# Patient Record
Sex: FEMALE | Race: WHITE | Employment: OTHER | URBAN - METROPOLITAN AREA
[De-identification: names, ages, dates, MRNs, and addresses within clinical notes are randomized per-mention and may not be internally consistent; named-entity substitution may affect disease eponyms.]

---

## 2018-01-01 ENCOUNTER — APPOINTMENT (OUTPATIENT)
Dept: CT IMAGING | Age: 78
DRG: 100 | End: 2018-01-01
Attending: PSYCHIATRY & NEUROLOGY
Payer: MEDICARE

## 2018-01-01 ENCOUNTER — APPOINTMENT (OUTPATIENT)
Dept: MRI IMAGING | Age: 78
DRG: 100 | End: 2018-01-01
Attending: PSYCHIATRY & NEUROLOGY
Payer: MEDICARE

## 2018-01-01 ENCOUNTER — OFFICE VISIT (OUTPATIENT)
Dept: NEUROLOGY | Age: 78
End: 2018-01-01
Payer: MEDICARE

## 2018-01-01 ENCOUNTER — APPOINTMENT (OUTPATIENT)
Dept: GENERAL RADIOLOGY | Age: 78
DRG: 100 | End: 2018-01-01
Attending: PSYCHIATRY & NEUROLOGY
Payer: MEDICARE

## 2018-01-01 ENCOUNTER — TELEPHONE (OUTPATIENT)
Dept: NEUROLOGY | Age: 78
End: 2018-01-01

## 2018-01-01 ENCOUNTER — HOSPITAL ENCOUNTER (INPATIENT)
Age: 78
LOS: 6 days | Discharge: NURSING, REHAB OR LONG-TERM CARE | DRG: 100 | End: 2018-09-10
Attending: PSYCHIATRY & NEUROLOGY | Admitting: PSYCHIATRY & NEUROLOGY
Payer: MEDICARE

## 2018-01-01 VITALS
HEART RATE: 67 BPM | HEIGHT: 60 IN | BODY MASS INDEX: 31.02 KG/M2 | SYSTOLIC BLOOD PRESSURE: 111 MMHG | WEIGHT: 158 LBS | DIASTOLIC BLOOD PRESSURE: 65 MMHG

## 2018-01-01 VITALS
WEIGHT: 164 LBS | SYSTOLIC BLOOD PRESSURE: 96 MMHG | BODY MASS INDEX: 32.2 KG/M2 | HEART RATE: 65 BPM | HEIGHT: 60 IN | DIASTOLIC BLOOD PRESSURE: 63 MMHG

## 2018-01-01 VITALS
HEART RATE: 68 BPM | RESPIRATION RATE: 15 BRPM | WEIGHT: 155.87 LBS | BODY MASS INDEX: 29.43 KG/M2 | DIASTOLIC BLOOD PRESSURE: 68 MMHG | TEMPERATURE: 97.6 F | SYSTOLIC BLOOD PRESSURE: 122 MMHG | HEIGHT: 61 IN | OXYGEN SATURATION: 98 %

## 2018-01-01 DIAGNOSIS — R41.0 EPISODE OF CONFUSION: Primary | ICD-10-CM

## 2018-01-01 DIAGNOSIS — I10 ESSENTIAL HYPERTENSION: ICD-10-CM

## 2018-01-01 DIAGNOSIS — I63.30 CEREBROVASCULAR ACCIDENT (CVA) DUE TO THROMBOSIS OF CEREBRAL ARTERY (HCC): Primary | ICD-10-CM

## 2018-01-01 DIAGNOSIS — R56.9 SEIZURE (HCC): ICD-10-CM

## 2018-01-01 DIAGNOSIS — F01.50 VASCULAR DEMENTIA WITHOUT BEHAVIORAL DISTURBANCE (HCC): ICD-10-CM

## 2018-01-01 DIAGNOSIS — Z86.73 HISTORY OF STROKE: ICD-10-CM

## 2018-01-01 DIAGNOSIS — I60.9 SAH (SUBARACHNOID HEMORRHAGE) (HCC): ICD-10-CM

## 2018-01-01 LAB
-: ABNORMAL
ABSOLUTE EOS #: 0.35 K/UL (ref 0–0.44)
ABSOLUTE IMMATURE GRANULOCYTE: 0.06 K/UL (ref 0–0.3)
ABSOLUTE LYMPH #: 1.6 K/UL (ref 1.1–3.7)
ABSOLUTE MONO #: 0.65 K/UL (ref 0.1–1.2)
ALBUMIN SERPL-MCNC: 3.2 G/DL (ref 3.5–5.2)
ALBUMIN SERPL-MCNC: 3.3 G/DL (ref 3.5–5.2)
ALBUMIN SERPL-MCNC: 3.3 G/DL (ref 3.5–5.2)
ALBUMIN SERPL-MCNC: 3.4 G/DL (ref 3.5–5.2)
ALBUMIN SERPL-MCNC: 3.4 G/DL (ref 3.5–5.2)
ALBUMIN SERPL-MCNC: 3.5 G/DL (ref 3.5–5.2)
ALBUMIN SERPL-MCNC: 3.5 G/DL (ref 3.5–5.2)
ALBUMIN/GLOBULIN RATIO: 1.1 (ref 1–2.5)
ALBUMIN/GLOBULIN RATIO: 1.1 (ref 1–2.5)
ALBUMIN/GLOBULIN RATIO: 1.2 (ref 1–2.5)
ALBUMIN/GLOBULIN RATIO: 1.3 (ref 1–2.5)
ALP BLD-CCNC: 81 U/L (ref 35–104)
ALP BLD-CCNC: 86 U/L (ref 35–104)
ALP BLD-CCNC: 86 U/L (ref 35–104)
ALP BLD-CCNC: 87 U/L (ref 35–104)
ALP BLD-CCNC: 91 U/L (ref 35–104)
ALP BLD-CCNC: 94 U/L (ref 35–104)
ALP BLD-CCNC: 96 U/L (ref 35–104)
ALT SERPL-CCNC: 11 U/L (ref 5–33)
ALT SERPL-CCNC: 13 U/L (ref 5–33)
ALT SERPL-CCNC: 14 U/L (ref 5–33)
ALT SERPL-CCNC: 14 U/L (ref 5–33)
ALT SERPL-CCNC: 15 U/L (ref 5–33)
ALT SERPL-CCNC: 16 U/L (ref 5–33)
ALT SERPL-CCNC: 17 U/L (ref 5–33)
AMORPHOUS: ABNORMAL
ANION GAP SERPL CALCULATED.3IONS-SCNC: 11 MMOL/L (ref 9–17)
ANION GAP SERPL CALCULATED.3IONS-SCNC: 11 MMOL/L (ref 9–17)
ANION GAP SERPL CALCULATED.3IONS-SCNC: 12 MMOL/L (ref 9–17)
ANION GAP SERPL CALCULATED.3IONS-SCNC: 12 MMOL/L (ref 9–17)
ANION GAP SERPL CALCULATED.3IONS-SCNC: 13 MMOL/L (ref 9–17)
ANION GAP SERPL CALCULATED.3IONS-SCNC: 13 MMOL/L (ref 9–17)
ANION GAP SERPL CALCULATED.3IONS-SCNC: 15 MMOL/L (ref 9–17)
AST SERPL-CCNC: 13 U/L
AST SERPL-CCNC: 18 U/L
AST SERPL-CCNC: 19 U/L
AST SERPL-CCNC: 19 U/L
AST SERPL-CCNC: 21 U/L
AST SERPL-CCNC: 22 U/L
AST SERPL-CCNC: 22 U/L
BACTERIA: ABNORMAL
BASOPHILS # BLD: 1 % (ref 0–2)
BASOPHILS ABSOLUTE: 0.08 K/UL (ref 0–0.2)
BILIRUB SERPL-MCNC: 0.17 MG/DL (ref 0.3–1.2)
BILIRUB SERPL-MCNC: 0.2 MG/DL (ref 0.3–1.2)
BILIRUB SERPL-MCNC: 0.21 MG/DL (ref 0.3–1.2)
BILIRUB SERPL-MCNC: 0.25 MG/DL (ref 0.3–1.2)
BILIRUB SERPL-MCNC: 0.26 MG/DL (ref 0.3–1.2)
BILIRUB SERPL-MCNC: <0.1 MG/DL (ref 0.3–1.2)
BILIRUB SERPL-MCNC: <0.1 MG/DL (ref 0.3–1.2)
BILIRUBIN URINE: NEGATIVE
BUN BLDV-MCNC: 11 MG/DL (ref 8–23)
BUN BLDV-MCNC: 12 MG/DL (ref 8–23)
BUN BLDV-MCNC: 8 MG/DL (ref 8–23)
BUN/CREAT BLD: ABNORMAL (ref 9–20)
CALCIUM SERPL-MCNC: 8.8 MG/DL (ref 8.6–10.4)
CALCIUM SERPL-MCNC: 8.9 MG/DL (ref 8.6–10.4)
CALCIUM SERPL-MCNC: 8.9 MG/DL (ref 8.6–10.4)
CALCIUM SERPL-MCNC: 9 MG/DL (ref 8.6–10.4)
CALCIUM SERPL-MCNC: 9.1 MG/DL (ref 8.6–10.4)
CALCIUM SERPL-MCNC: 9.1 MG/DL (ref 8.6–10.4)
CALCIUM SERPL-MCNC: 9.2 MG/DL (ref 8.6–10.4)
CASTS UA: ABNORMAL /LPF (ref 0–2)
CHLORIDE BLD-SCNC: 101 MMOL/L (ref 98–107)
CHLORIDE BLD-SCNC: 102 MMOL/L (ref 98–107)
CHLORIDE BLD-SCNC: 103 MMOL/L (ref 98–107)
CHOLESTEROL/HDL RATIO: 2.9
CHOLESTEROL: 123 MG/DL
CO2: 21 MMOL/L (ref 20–31)
CO2: 22 MMOL/L (ref 20–31)
CO2: 24 MMOL/L (ref 20–31)
CO2: 25 MMOL/L (ref 20–31)
CO2: 25 MMOL/L (ref 20–31)
COLOR: YELLOW
COMMENT UA: ABNORMAL
CREAT SERPL-MCNC: 0.56 MG/DL (ref 0.5–0.9)
CREAT SERPL-MCNC: 0.57 MG/DL (ref 0.5–0.9)
CREAT SERPL-MCNC: 0.59 MG/DL (ref 0.5–0.9)
CREAT SERPL-MCNC: 0.59 MG/DL (ref 0.5–0.9)
CREAT SERPL-MCNC: 0.6 MG/DL (ref 0.5–0.9)
CREAT SERPL-MCNC: 0.6 MG/DL (ref 0.5–0.9)
CREAT SERPL-MCNC: 0.69 MG/DL (ref 0.5–0.9)
CRYSTALS, UA: ABNORMAL /HPF
CULTURE: NORMAL
DIFFERENTIAL TYPE: ABNORMAL
EOSINOPHILS RELATIVE PERCENT: 5 % (ref 1–4)
EPITHELIAL CELLS UA: ABNORMAL /HPF (ref 0–5)
ESTIMATED AVERAGE GLUCOSE: 154 MG/DL
FOLATE: 5.7 NG/ML
GFR AFRICAN AMERICAN: >60 ML/MIN
GFR NON-AFRICAN AMERICAN: >60 ML/MIN
GFR SERPL CREATININE-BSD FRML MDRD: ABNORMAL ML/MIN/{1.73_M2}
GLUCOSE BLD-MCNC: 132 MG/DL (ref 70–99)
GLUCOSE BLD-MCNC: 136 MG/DL (ref 70–99)
GLUCOSE BLD-MCNC: 139 MG/DL (ref 70–99)
GLUCOSE BLD-MCNC: 140 MG/DL (ref 70–99)
GLUCOSE BLD-MCNC: 143 MG/DL (ref 70–99)
GLUCOSE BLD-MCNC: 145 MG/DL (ref 70–99)
GLUCOSE BLD-MCNC: 147 MG/DL (ref 70–99)
GLUCOSE URINE: ABNORMAL
HBA1C MFR BLD: 7 % (ref 4–6)
HCT VFR BLD CALC: 38.8 % (ref 36.3–47.1)
HCT VFR BLD CALC: 40.6 % (ref 36.3–47.1)
HDLC SERPL-MCNC: 42 MG/DL
HEMOGLOBIN: 12.4 G/DL (ref 11.9–15.1)
HEMOGLOBIN: 12.8 G/DL (ref 11.9–15.1)
IMMATURE GRANULOCYTES: 1 %
KETONES, URINE: ABNORMAL
LDL CHOLESTEROL: 17 MG/DL (ref 0–130)
LEUKOCYTE ESTERASE, URINE: ABNORMAL
LV EF: 55 %
LVEF MODALITY: NORMAL
LYMPHOCYTES # BLD: 21 % (ref 24–43)
Lab: NORMAL
MAGNESIUM: 2 MG/DL (ref 1.6–2.6)
MAGNESIUM: 2 MG/DL (ref 1.6–2.6)
MAGNESIUM: 2.1 MG/DL (ref 1.6–2.6)
MAGNESIUM: 2.1 MG/DL (ref 1.6–2.6)
MAGNESIUM: 2.2 MG/DL (ref 1.6–2.6)
MAGNESIUM: 2.2 MG/DL (ref 1.6–2.6)
MAGNESIUM: 2.3 MG/DL (ref 1.6–2.6)
MCH RBC QN AUTO: 28.1 PG (ref 25.2–33.5)
MCH RBC QN AUTO: 28.5 PG (ref 25.2–33.5)
MCHC RBC AUTO-ENTMCNC: 31.5 G/DL (ref 28.4–34.8)
MCHC RBC AUTO-ENTMCNC: 32 G/DL (ref 28.4–34.8)
MCV RBC AUTO: 89.2 FL (ref 82.6–102.9)
MCV RBC AUTO: 89.2 FL (ref 82.6–102.9)
MONOCYTES # BLD: 9 % (ref 3–12)
MUCUS: ABNORMAL
NITRITE, URINE: NEGATIVE
NRBC AUTOMATED: 0 PER 100 WBC
NRBC AUTOMATED: 0 PER 100 WBC
OTHER OBSERVATIONS UA: ABNORMAL
PDW BLD-RTO: 14.9 % (ref 11.8–14.4)
PDW BLD-RTO: 15.2 % (ref 11.8–14.4)
PH UA: 6 (ref 5–8)
PHOSPHORUS: 3.2 MG/DL (ref 2.6–4.5)
PLATELET # BLD: 175 K/UL (ref 138–453)
PLATELET # BLD: 179 K/UL (ref 138–453)
PLATELET ESTIMATE: ABNORMAL
PMV BLD AUTO: 11 FL (ref 8.1–13.5)
PMV BLD AUTO: 11.2 FL (ref 8.1–13.5)
POTASSIUM SERPL-SCNC: 3.6 MMOL/L (ref 3.7–5.3)
POTASSIUM SERPL-SCNC: 3.7 MMOL/L (ref 3.7–5.3)
POTASSIUM SERPL-SCNC: 3.7 MMOL/L (ref 3.7–5.3)
POTASSIUM SERPL-SCNC: 4 MMOL/L (ref 3.7–5.3)
PROTEIN UA: NEGATIVE
RBC # BLD: 4.35 M/UL (ref 3.95–5.11)
RBC # BLD: 4.55 M/UL (ref 3.95–5.11)
RBC # BLD: ABNORMAL 10*6/UL
RBC UA: ABNORMAL /HPF (ref 0–2)
RENAL EPITHELIAL, UA: ABNORMAL /HPF
SEG NEUTROPHILS: 63 % (ref 36–65)
SEGMENTED NEUTROPHILS ABSOLUTE COUNT: 4.93 K/UL (ref 1.5–8.1)
SODIUM BLD-SCNC: 136 MMOL/L (ref 135–144)
SODIUM BLD-SCNC: 136 MMOL/L (ref 135–144)
SODIUM BLD-SCNC: 137 MMOL/L (ref 135–144)
SODIUM BLD-SCNC: 137 MMOL/L (ref 135–144)
SODIUM BLD-SCNC: 138 MMOL/L (ref 135–144)
SODIUM BLD-SCNC: 140 MMOL/L (ref 135–144)
SODIUM BLD-SCNC: 140 MMOL/L (ref 135–144)
SPECIFIC GRAVITY UA: 1.01 (ref 1–1.03)
SPECIMEN DESCRIPTION: NORMAL
STATUS: NORMAL
THYROXINE, FREE: 0.78 NG/DL (ref 0.93–1.7)
TOTAL PROTEIN: 6 G/DL (ref 6.4–8.3)
TOTAL PROTEIN: 6 G/DL (ref 6.4–8.3)
TOTAL PROTEIN: 6.1 G/DL (ref 6.4–8.3)
TOTAL PROTEIN: 6.2 G/DL (ref 6.4–8.3)
TOTAL PROTEIN: 6.2 G/DL (ref 6.4–8.3)
TOTAL PROTEIN: 6.3 G/DL (ref 6.4–8.3)
TOTAL PROTEIN: 6.5 G/DL (ref 6.4–8.3)
TRICHOMONAS: ABNORMAL
TRIGL SERPL-MCNC: 322 MG/DL
TSH SERPL DL<=0.05 MIU/L-ACNC: 7.51 MIU/L (ref 0.3–5)
TURBIDITY: CLEAR
URINE HGB: ABNORMAL
UROBILINOGEN, URINE: NORMAL
VITAMIN B-12: 352 PG/ML (ref 232–1245)
VLDLC SERPL CALC-MCNC: ABNORMAL MG/DL (ref 1–30)
WBC # BLD: 7.7 K/UL (ref 3.5–11.3)
WBC # BLD: 8.4 K/UL (ref 3.5–11.3)
WBC # BLD: ABNORMAL 10*3/UL
WBC UA: ABNORMAL /HPF (ref 0–5)
YEAST: ABNORMAL

## 2018-01-01 PROCEDURE — 99214 OFFICE O/P EST MOD 30 MIN: CPT | Performed by: PSYCHIATRY & NEUROLOGY

## 2018-01-01 PROCEDURE — G8484 FLU IMMUNIZE NO ADMIN: HCPCS | Performed by: PSYCHIATRY & NEUROLOGY

## 2018-01-01 PROCEDURE — 99232 SBSQ HOSP IP/OBS MODERATE 35: CPT | Performed by: PSYCHIATRY & NEUROLOGY

## 2018-01-01 PROCEDURE — 2060000000 HC ICU INTERMEDIATE R&B

## 2018-01-01 PROCEDURE — G8987 SELF CARE CURRENT STATUS: HCPCS

## 2018-01-01 PROCEDURE — 36415 COLL VENOUS BLD VENIPUNCTURE: CPT

## 2018-01-01 PROCEDURE — G8988 SELF CARE GOAL STATUS: HCPCS

## 2018-01-01 PROCEDURE — 84439 ASSAY OF FREE THYROXINE: CPT

## 2018-01-01 PROCEDURE — 6370000000 HC RX 637 (ALT 250 FOR IP): Performed by: INTERNAL MEDICINE

## 2018-01-01 PROCEDURE — 93306 TTE W/DOPPLER COMPLETE: CPT

## 2018-01-01 PROCEDURE — 6370000000 HC RX 637 (ALT 250 FOR IP): Performed by: STUDENT IN AN ORGANIZED HEALTH CARE EDUCATION/TRAINING PROGRAM

## 2018-01-01 PROCEDURE — 87086 URINE CULTURE/COLONY COUNT: CPT

## 2018-01-01 PROCEDURE — G8417 CALC BMI ABV UP PARAM F/U: HCPCS | Performed by: PSYCHIATRY & NEUROLOGY

## 2018-01-01 PROCEDURE — 83735 ASSAY OF MAGNESIUM: CPT

## 2018-01-01 PROCEDURE — 6370000000 HC RX 637 (ALT 250 FOR IP): Performed by: NURSE PRACTITIONER

## 2018-01-01 PROCEDURE — 95816 EEG AWAKE AND DROWSY: CPT | Performed by: PSYCHIATRY & NEUROLOGY

## 2018-01-01 PROCEDURE — 94762 N-INVAS EAR/PLS OXIMTRY CONT: CPT

## 2018-01-01 PROCEDURE — 70496 CT ANGIOGRAPHY HEAD: CPT

## 2018-01-01 PROCEDURE — 2580000003 HC RX 258: Performed by: STUDENT IN AN ORGANIZED HEALTH CARE EDUCATION/TRAINING PROGRAM

## 2018-01-01 PROCEDURE — 6360000002 HC RX W HCPCS: Performed by: STUDENT IN AN ORGANIZED HEALTH CARE EDUCATION/TRAINING PROGRAM

## 2018-01-01 PROCEDURE — 80053 COMPREHEN METABOLIC PANEL: CPT

## 2018-01-01 PROCEDURE — G8598 ASA/ANTIPLAT THER USED: HCPCS | Performed by: PSYCHIATRY & NEUROLOGY

## 2018-01-01 PROCEDURE — 97535 SELF CARE MNGMENT TRAINING: CPT

## 2018-01-01 PROCEDURE — 99233 SBSQ HOSP IP/OBS HIGH 50: CPT | Performed by: PSYCHIATRY & NEUROLOGY

## 2018-01-01 PROCEDURE — 82607 VITAMIN B-12: CPT

## 2018-01-01 PROCEDURE — 70551 MRI BRAIN STEM W/O DYE: CPT

## 2018-01-01 PROCEDURE — 85025 COMPLETE CBC W/AUTO DIFF WBC: CPT

## 2018-01-01 PROCEDURE — 1090F PRES/ABSN URINE INCON ASSESS: CPT | Performed by: PSYCHIATRY & NEUROLOGY

## 2018-01-01 PROCEDURE — 4040F PNEUMOC VAC/ADMIN/RCVD: CPT | Performed by: PSYCHIATRY & NEUROLOGY

## 2018-01-01 PROCEDURE — 82746 ASSAY OF FOLIC ACID SERUM: CPT

## 2018-01-01 PROCEDURE — 97530 THERAPEUTIC ACTIVITIES: CPT

## 2018-01-01 PROCEDURE — 71045 X-RAY EXAM CHEST 1 VIEW: CPT

## 2018-01-01 PROCEDURE — 1123F ACP DISCUSS/DSCN MKR DOCD: CPT | Performed by: PSYCHIATRY & NEUROLOGY

## 2018-01-01 PROCEDURE — 6360000004 HC RX CONTRAST MEDICATION: Performed by: PSYCHIATRY & NEUROLOGY

## 2018-01-01 PROCEDURE — 1111F DSCHRG MED/CURRENT MED MERGE: CPT | Performed by: PSYCHIATRY & NEUROLOGY

## 2018-01-01 PROCEDURE — 84100 ASSAY OF PHOSPHORUS: CPT

## 2018-01-01 PROCEDURE — 97162 PT EVAL MOD COMPLEX 30 MIN: CPT

## 2018-01-01 PROCEDURE — 83036 HEMOGLOBIN GLYCOSYLATED A1C: CPT

## 2018-01-01 PROCEDURE — 84443 ASSAY THYROID STIM HORMONE: CPT

## 2018-01-01 PROCEDURE — G8427 DOCREV CUR MEDS BY ELIG CLIN: HCPCS | Performed by: PSYCHIATRY & NEUROLOGY

## 2018-01-01 PROCEDURE — 81001 URINALYSIS AUTO W/SCOPE: CPT

## 2018-01-01 PROCEDURE — 99232 SBSQ HOSP IP/OBS MODERATE 35: CPT | Performed by: INTERNAL MEDICINE

## 2018-01-01 PROCEDURE — G8400 PT W/DXA NO RESULTS DOC: HCPCS | Performed by: PSYCHIATRY & NEUROLOGY

## 2018-01-01 PROCEDURE — G8979 MOBILITY GOAL STATUS: HCPCS

## 2018-01-01 PROCEDURE — 99221 1ST HOSP IP/OBS SF/LOW 40: CPT | Performed by: INTERNAL MEDICINE

## 2018-01-01 PROCEDURE — 97166 OT EVAL MOD COMPLEX 45 MIN: CPT

## 2018-01-01 PROCEDURE — 70498 CT ANGIOGRAPHY NECK: CPT

## 2018-01-01 PROCEDURE — 99238 HOSP IP/OBS DSCHRG MGMT 30/<: CPT | Performed by: PSYCHIATRY & NEUROLOGY

## 2018-01-01 PROCEDURE — 1036F TOBACCO NON-USER: CPT | Performed by: PSYCHIATRY & NEUROLOGY

## 2018-01-01 PROCEDURE — 80061 LIPID PANEL: CPT

## 2018-01-01 PROCEDURE — 99223 1ST HOSP IP/OBS HIGH 75: CPT | Performed by: PSYCHIATRY & NEUROLOGY

## 2018-01-01 PROCEDURE — 85027 COMPLETE CBC AUTOMATED: CPT

## 2018-01-01 PROCEDURE — 1101F PT FALLS ASSESS-DOCD LE1/YR: CPT | Performed by: PSYCHIATRY & NEUROLOGY

## 2018-01-01 PROCEDURE — 97110 THERAPEUTIC EXERCISES: CPT

## 2018-01-01 PROCEDURE — 97116 GAIT TRAINING THERAPY: CPT

## 2018-01-01 PROCEDURE — 95816 EEG AWAKE AND DROWSY: CPT

## 2018-01-01 PROCEDURE — 99232 SBSQ HOSP IP/OBS MODERATE 35: CPT | Performed by: NURSE PRACTITIONER

## 2018-01-01 PROCEDURE — 99215 OFFICE O/P EST HI 40 MIN: CPT | Performed by: PSYCHIATRY & NEUROLOGY

## 2018-01-01 PROCEDURE — G8978 MOBILITY CURRENT STATUS: HCPCS

## 2018-01-01 RX ORDER — ACETAMINOPHEN 325 MG/1
650 TABLET ORAL EVERY 4 HOURS PRN
Status: DISCONTINUED | OUTPATIENT
Start: 2018-01-01 | End: 2018-01-01 | Stop reason: HOSPADM

## 2018-01-01 RX ORDER — SODIUM CHLORIDE 9 MG/ML
INJECTION, SOLUTION INTRAVENOUS CONTINUOUS
Status: DISCONTINUED | OUTPATIENT
Start: 2018-01-01 | End: 2018-01-01

## 2018-01-01 RX ORDER — SODIUM CHLORIDE 0.9 % (FLUSH) 0.9 %
10 SYRINGE (ML) INJECTION PRN
Status: DISCONTINUED | OUTPATIENT
Start: 2018-01-01 | End: 2018-01-01 | Stop reason: HOSPADM

## 2018-01-01 RX ORDER — ROSUVASTATIN CALCIUM 5 MG/1
10 TABLET, COATED ORAL NIGHTLY
Status: DISCONTINUED | OUTPATIENT
Start: 2018-01-01 | End: 2018-01-01 | Stop reason: HOSPADM

## 2018-01-01 RX ORDER — LEVOTHYROXINE SODIUM 0.03 MG/1
TABLET ORAL
COMMUNITY
Start: 2018-08-30

## 2018-01-01 RX ORDER — ASPIRIN 81 MG/1
81 TABLET, CHEWABLE ORAL DAILY
Qty: 30 TABLET | Refills: 3 | Status: SHIPPED | OUTPATIENT
Start: 2018-01-01

## 2018-01-01 RX ORDER — MEMANTINE HYDROCHLORIDE 28 MG/1
CAPSULE, EXTENDED RELEASE ORAL
COMMUNITY
Start: 2018-08-30 | End: 2019-01-01 | Stop reason: SDUPTHER

## 2018-01-01 RX ORDER — ASPIRIN 81 MG/1
81 TABLET, CHEWABLE ORAL DAILY
Status: DISCONTINUED | OUTPATIENT
Start: 2018-01-01 | End: 2018-01-01 | Stop reason: HOSPADM

## 2018-01-01 RX ORDER — LISINOPRIL 2.5 MG/1
TABLET ORAL
COMMUNITY
Start: 2018-04-09

## 2018-01-01 RX ORDER — CITALOPRAM 10 MG/1
TABLET ORAL
COMMUNITY
Start: 2017-10-09

## 2018-01-01 RX ORDER — METOPROLOL SUCCINATE 25 MG/1
25 TABLET, EXTENDED RELEASE ORAL DAILY
Status: DISCONTINUED | OUTPATIENT
Start: 2018-01-01 | End: 2018-01-01 | Stop reason: HOSPADM

## 2018-01-01 RX ORDER — SODIUM CHLORIDE 0.9 % (FLUSH) 0.9 %
10 SYRINGE (ML) INJECTION EVERY 12 HOURS SCHEDULED
Status: DISCONTINUED | OUTPATIENT
Start: 2018-01-01 | End: 2018-01-01 | Stop reason: HOSPADM

## 2018-01-01 RX ORDER — LIDOCAINE 50 MG/G
1 PATCH TOPICAL DAILY
Status: DISCONTINUED | OUTPATIENT
Start: 2018-01-01 | End: 2018-01-01 | Stop reason: HOSPADM

## 2018-01-01 RX ORDER — BUTALBITAL, ACETAMINOPHEN AND CAFFEINE 50; 325; 40 MG/1; MG/1; MG/1
1-2 TABLET ORAL
COMMUNITY
Start: 2018-08-29

## 2018-01-01 RX ORDER — LEVOTHYROXINE SODIUM 0.07 MG/1
37 TABLET ORAL DAILY
Status: DISCONTINUED | OUTPATIENT
Start: 2018-01-01 | End: 2018-01-01 | Stop reason: HOSPADM

## 2018-01-01 RX ORDER — CITALOPRAM 10 MG/1
10 TABLET ORAL DAILY
Status: DISCONTINUED | OUTPATIENT
Start: 2018-01-01 | End: 2018-01-01 | Stop reason: HOSPADM

## 2018-01-01 RX ORDER — LISINOPRIL 2.5 MG/1
2.5 TABLET ORAL DAILY
Status: DISCONTINUED | OUTPATIENT
Start: 2018-01-01 | End: 2018-01-01

## 2018-01-01 RX ORDER — LACOSAMIDE 100 MG/1
100 TABLET ORAL
Status: ON HOLD | COMMUNITY
Start: 2018-08-29 | End: 2018-01-01 | Stop reason: HOSPADM

## 2018-01-01 RX ORDER — CHLORHEXIDINE GLUCONATE 0.12 MG/ML
15 RINSE ORAL 2 TIMES DAILY
Status: DISCONTINUED | OUTPATIENT
Start: 2018-01-01 | End: 2018-01-01 | Stop reason: HOSPADM

## 2018-01-01 RX ORDER — METOPROLOL SUCCINATE 25 MG/1
25 TABLET, EXTENDED RELEASE ORAL
COMMUNITY
Start: 2017-09-12 | End: 2018-01-01

## 2018-01-01 RX ORDER — LACOSAMIDE 10 MG/ML
100 SOLUTION ORAL 2 TIMES DAILY
Status: DISCONTINUED | OUTPATIENT
Start: 2018-01-01 | End: 2018-01-01

## 2018-01-01 RX ORDER — ONDANSETRON 2 MG/ML
4 INJECTION INTRAMUSCULAR; INTRAVENOUS EVERY 6 HOURS PRN
Status: DISCONTINUED | OUTPATIENT
Start: 2018-01-01 | End: 2018-01-01 | Stop reason: HOSPADM

## 2018-01-01 RX ORDER — LEVETIRACETAM 250 MG/1
250 TABLET ORAL 2 TIMES DAILY
Qty: 60 TABLET | Refills: 3 | Status: SHIPPED | OUTPATIENT
Start: 2018-01-01 | End: 2018-01-01 | Stop reason: SDUPTHER

## 2018-01-01 RX ORDER — CALCIUM CARBONATE 200(500)MG
500 TABLET,CHEWABLE ORAL 3 TIMES DAILY PRN
Status: DISCONTINUED | OUTPATIENT
Start: 2018-01-01 | End: 2018-01-01 | Stop reason: HOSPADM

## 2018-01-01 RX ORDER — LEVETIRACETAM 250 MG/1
250 TABLET ORAL 2 TIMES DAILY
Qty: 60 TABLET | Refills: 3 | Status: SHIPPED | OUTPATIENT
Start: 2018-01-01 | End: 2019-01-01 | Stop reason: SDUPTHER

## 2018-01-01 RX ORDER — LEVOTHYROXINE SODIUM 0.03 MG/1
25 TABLET ORAL DAILY
Status: DISCONTINUED | OUTPATIENT
Start: 2018-01-01 | End: 2018-01-01

## 2018-01-01 RX ORDER — ROSUVASTATIN CALCIUM 10 MG/1
10 TABLET, COATED ORAL NIGHTLY
Qty: 30 TABLET | Refills: 3 | Status: SHIPPED | OUTPATIENT
Start: 2018-01-01

## 2018-01-01 RX ORDER — LISINOPRIL 5 MG/1
5 TABLET ORAL DAILY
Status: DISCONTINUED | OUTPATIENT
Start: 2018-01-01 | End: 2018-01-01 | Stop reason: HOSPADM

## 2018-01-01 RX ADMIN — CHLORHEXIDINE GLUCONATE 0.12% ORAL RINSE 15 ML: 1.2 LIQUID ORAL at 09:32

## 2018-01-01 RX ADMIN — CITALOPRAM 10 MG: 10 TABLET, FILM COATED ORAL at 08:45

## 2018-01-01 RX ADMIN — LISINOPRIL 2.5 MG: 2.5 TABLET ORAL at 13:48

## 2018-01-01 RX ADMIN — ENOXAPARIN SODIUM 40 MG: 40 INJECTION SUBCUTANEOUS at 08:45

## 2018-01-01 RX ADMIN — ENOXAPARIN SODIUM 40 MG: 40 INJECTION SUBCUTANEOUS at 10:12

## 2018-01-01 RX ADMIN — Medication 10 ML: at 19:42

## 2018-01-01 RX ADMIN — ASPIRIN 81 MG: 81 TABLET, CHEWABLE ORAL at 10:12

## 2018-01-01 RX ADMIN — ROSUVASTATIN CALCIUM 10 MG: 5 TABLET, FILM COATED ORAL at 20:20

## 2018-01-01 RX ADMIN — CITALOPRAM 10 MG: 10 TABLET, FILM COATED ORAL at 10:12

## 2018-01-01 RX ADMIN — LEVOTHYROXINE SODIUM 25 MCG: 25 TABLET ORAL at 13:49

## 2018-01-01 RX ADMIN — CITALOPRAM 10 MG: 10 TABLET, FILM COATED ORAL at 13:48

## 2018-01-01 RX ADMIN — ENOXAPARIN SODIUM 40 MG: 40 INJECTION SUBCUTANEOUS at 09:30

## 2018-01-01 RX ADMIN — Medication 10 ML: at 00:29

## 2018-01-01 RX ADMIN — Medication 10 ML: at 20:21

## 2018-01-01 RX ADMIN — ACETAMINOPHEN 650 MG: 325 TABLET ORAL at 14:46

## 2018-01-01 RX ADMIN — METOPROLOL SUCCINATE 25 MG: 25 TABLET, FILM COATED, EXTENDED RELEASE ORAL at 13:49

## 2018-01-01 RX ADMIN — METOPROLOL SUCCINATE 25 MG: 25 TABLET, FILM COATED, EXTENDED RELEASE ORAL at 10:48

## 2018-01-01 RX ADMIN — Medication 10 ML: at 20:58

## 2018-01-01 RX ADMIN — LEVOTHYROXINE SODIUM 37.5 MCG: 75 TABLET ORAL at 08:45

## 2018-01-01 RX ADMIN — ASPIRIN 81 MG: 81 TABLET, CHEWABLE ORAL at 10:48

## 2018-01-01 RX ADMIN — LISINOPRIL 5 MG: 5 TABLET ORAL at 09:32

## 2018-01-01 RX ADMIN — ANTACID TABLETS 500 MG: 500 TABLET, CHEWABLE ORAL at 13:49

## 2018-01-01 RX ADMIN — ACETAMINOPHEN 650 MG: 325 TABLET ORAL at 15:35

## 2018-01-01 RX ADMIN — ACETAMINOPHEN 650 MG: 325 TABLET ORAL at 12:55

## 2018-01-01 RX ADMIN — ROSUVASTATIN CALCIUM 10 MG: 5 TABLET, FILM COATED ORAL at 21:44

## 2018-01-01 RX ADMIN — METOPROLOL SUCCINATE 25 MG: 25 TABLET, FILM COATED, EXTENDED RELEASE ORAL at 09:32

## 2018-01-01 RX ADMIN — METOPROLOL SUCCINATE 25 MG: 25 TABLET, FILM COATED, EXTENDED RELEASE ORAL at 09:30

## 2018-01-01 RX ADMIN — Medication 100 MG: at 13:50

## 2018-01-01 RX ADMIN — ASPIRIN 81 MG: 81 TABLET, CHEWABLE ORAL at 09:32

## 2018-01-01 RX ADMIN — Medication 10 ML: at 21:45

## 2018-01-01 RX ADMIN — SODIUM CHLORIDE: 9 INJECTION, SOLUTION INTRAVENOUS at 23:30

## 2018-01-01 RX ADMIN — LEVOTHYROXINE SODIUM 37.5 MCG: 75 TABLET ORAL at 09:32

## 2018-01-01 RX ADMIN — ACETAMINOPHEN 650 MG: 325 TABLET ORAL at 06:21

## 2018-01-01 RX ADMIN — ROSUVASTATIN CALCIUM 10 MG: 5 TABLET, FILM COATED ORAL at 00:29

## 2018-01-01 RX ADMIN — ENOXAPARIN SODIUM 40 MG: 40 INJECTION SUBCUTANEOUS at 09:40

## 2018-01-01 RX ADMIN — Medication 10 ML: at 10:13

## 2018-01-01 RX ADMIN — ACETAMINOPHEN 650 MG: 325 TABLET ORAL at 21:45

## 2018-01-01 RX ADMIN — LISINOPRIL 2.5 MG: 2.5 TABLET ORAL at 10:48

## 2018-01-01 RX ADMIN — ACETAMINOPHEN 650 MG: 325 TABLET ORAL at 11:28

## 2018-01-01 RX ADMIN — METOPROLOL SUCCINATE 25 MG: 25 TABLET, FILM COATED, EXTENDED RELEASE ORAL at 08:45

## 2018-01-01 RX ADMIN — CITALOPRAM 10 MG: 10 TABLET, FILM COATED ORAL at 09:32

## 2018-01-01 RX ADMIN — ACETAMINOPHEN 650 MG: 325 TABLET ORAL at 17:10

## 2018-01-01 RX ADMIN — LISINOPRIL 5 MG: 5 TABLET ORAL at 10:12

## 2018-01-01 RX ADMIN — Medication 10 ML: at 10:49

## 2018-01-01 RX ADMIN — CHLORHEXIDINE GLUCONATE 0.12% ORAL RINSE 15 ML: 1.2 LIQUID ORAL at 10:13

## 2018-01-01 RX ADMIN — CITALOPRAM 10 MG: 10 TABLET, FILM COATED ORAL at 10:48

## 2018-01-01 RX ADMIN — ACETAMINOPHEN 650 MG: 325 TABLET ORAL at 16:47

## 2018-01-01 RX ADMIN — ENOXAPARIN SODIUM 40 MG: 40 INJECTION SUBCUTANEOUS at 10:48

## 2018-01-01 RX ADMIN — ACETAMINOPHEN 650 MG: 325 TABLET ORAL at 10:22

## 2018-01-01 RX ADMIN — CHLORHEXIDINE GLUCONATE 0.12% ORAL RINSE 15 ML: 1.2 LIQUID ORAL at 21:44

## 2018-01-01 RX ADMIN — ROSUVASTATIN CALCIUM 10 MG: 5 TABLET, FILM COATED ORAL at 20:55

## 2018-01-01 RX ADMIN — LEVOTHYROXINE SODIUM 37.5 MCG: 75 TABLET ORAL at 10:48

## 2018-01-01 RX ADMIN — METOPROLOL SUCCINATE 25 MG: 25 TABLET, FILM COATED, EXTENDED RELEASE ORAL at 10:12

## 2018-01-01 RX ADMIN — LISINOPRIL 2.5 MG: 2.5 TABLET ORAL at 09:30

## 2018-01-01 RX ADMIN — LISINOPRIL 2.5 MG: 2.5 TABLET ORAL at 08:45

## 2018-01-01 RX ADMIN — ASPIRIN 81 MG: 81 TABLET, CHEWABLE ORAL at 08:45

## 2018-01-01 RX ADMIN — ACETAMINOPHEN 650 MG: 325 TABLET ORAL at 10:44

## 2018-01-01 RX ADMIN — ACETAMINOPHEN 650 MG: 325 TABLET ORAL at 10:19

## 2018-01-01 RX ADMIN — CITALOPRAM 10 MG: 10 TABLET, FILM COATED ORAL at 09:31

## 2018-01-01 RX ADMIN — Medication 10 ML: at 08:45

## 2018-01-01 RX ADMIN — Medication 100 MG: at 00:29

## 2018-01-01 RX ADMIN — ROSUVASTATIN CALCIUM 10 MG: 5 TABLET, FILM COATED ORAL at 20:57

## 2018-01-01 RX ADMIN — LEVOTHYROXINE SODIUM 37.5 MCG: 75 TABLET ORAL at 10:12

## 2018-01-01 RX ADMIN — Medication 10 ML: at 09:33

## 2018-01-01 RX ADMIN — IOPAMIDOL 90 ML: 755 INJECTION, SOLUTION INTRAVENOUS at 11:53

## 2018-01-01 RX ADMIN — ROSUVASTATIN CALCIUM 10 MG: 5 TABLET, FILM COATED ORAL at 19:42

## 2018-01-01 ASSESSMENT — PAIN DESCRIPTION - ORIENTATION: ORIENTATION: MID

## 2018-01-01 ASSESSMENT — PAIN SCALES - GENERAL
PAINLEVEL_OUTOF10: 6
PAINLEVEL_OUTOF10: 8
PAINLEVEL_OUTOF10: 0
PAINLEVEL_OUTOF10: 2
PAINLEVEL_OUTOF10: 6
PAINLEVEL_OUTOF10: 5
PAINLEVEL_OUTOF10: 7
PAINLEVEL_OUTOF10: 0
PAINLEVEL_OUTOF10: 6
PAINLEVEL_OUTOF10: 0
PAINLEVEL_OUTOF10: 4
PAINLEVEL_OUTOF10: 0
PAINLEVEL_OUTOF10: 0
PAINLEVEL_OUTOF10: 5
PAINLEVEL_OUTOF10: 2
PAINLEVEL_OUTOF10: 3
PAINLEVEL_OUTOF10: 2
PAINLEVEL_OUTOF10: 2
PAINLEVEL_OUTOF10: 0

## 2018-01-01 ASSESSMENT — PAIN DESCRIPTION - PAIN TYPE
TYPE: ACUTE PAIN

## 2018-01-01 ASSESSMENT — PAIN SCALES - WONG BAKER
WONGBAKER_NUMERICALRESPONSE: 6
WONGBAKER_NUMERICALRESPONSE: 2
WONGBAKER_NUMERICALRESPONSE: 4

## 2018-01-01 ASSESSMENT — ENCOUNTER SYMPTOMS
COLOR CHANGE: 0
VOICE CHANGE: 0
COUGH: 0
NAUSEA: 0
SHORTNESS OF BREATH: 0
PHOTOPHOBIA: 0
VOMITING: 0

## 2018-01-01 ASSESSMENT — PAIN DESCRIPTION - LOCATION
LOCATION: GENERALIZED
LOCATION: COCCYX;BUTTOCKS
LOCATION: COCCYX
LOCATION: NECK
LOCATION: HEAD
LOCATION: BACK
LOCATION: COCCYX
LOCATION: BUTTOCKS
LOCATION: COCCYX
LOCATION: GENERALIZED

## 2018-01-01 ASSESSMENT — PAIN DESCRIPTION - DESCRIPTORS
DESCRIPTORS: HEADACHE
DESCRIPTORS: ACHING
DESCRIPTORS: ACHING
DESCRIPTORS: ACHING;DISCOMFORT
DESCRIPTORS: ACHING
DESCRIPTORS: ACHING;SORE;TENDER

## 2018-01-01 ASSESSMENT — PAIN DESCRIPTION - FREQUENCY
FREQUENCY: INTERMITTENT

## 2018-01-01 ASSESSMENT — PAIN DESCRIPTION - ONSET: ONSET: ON-GOING

## 2018-09-04 PROBLEM — R41.82 ALTERED MENTAL STATUS: Status: ACTIVE | Noted: 2018-01-01

## 2018-09-05 PROBLEM — F03.90 DEMENTIA (HCC): Status: ACTIVE | Noted: 2018-01-01

## 2018-09-05 PROBLEM — I60.9 SAH (SUBARACHNOID HEMORRHAGE) (HCC): Status: ACTIVE | Noted: 2018-01-01

## 2018-09-05 PROBLEM — C18.2 MALIGNANT NEOPLASM OF ASCENDING COLON (HCC): Status: ACTIVE | Noted: 2017-06-06

## 2018-09-06 NOTE — CARE COORDINATION
Alerted by PT that they are recommending SNF placement. Spoke with Daughter at bedside. She would like [facility] in [facility]. Call to [facility] and spoke with Briana,  Face sheet faxed as req to [phone].   They do have access to [employer]

## 2018-09-06 NOTE — PROGRESS NOTES
Orientation  Orientation  Overall Orientation Status: Impaired  Orientation Level: Disoriented X4    Social/Functional History  Social/Functional History  Lives With: Daughter  Type of Home: House  Home Layout: Two level, Able to Live on Main level with bedroom/bathroom  Bathroom Equipment: Shower chair  Home Equipment: 4 wheeled walker  Receives Help From: Family  ADL Assistance: Needs assistance  Homemaking Assistance: Needs assistance  Homemaking Responsibilities: No  Ambulation Assistance: Independent  Transfer Assistance: Independent  Objective    AROM RLE (degrees)  RLE AROM: WFL  AROM LLE (degrees)  LLE AROM : WFL  AROM RUE (degrees)  RUE AROM : WFL  AROM LUE (degrees)  LUE AROM : WFL  Strength Other  Other: Unable to MMT. Moves antigravity. Motor Control  Gross Motor?: WFL  Sensation  Overall Sensation Status: WFL  Bed mobility  Supine to Sit: Moderate assistance  Scooting: Moderate assistance  Transfers  Sit to Stand: Moderate Assistance  Stand to sit: Moderate Assistance  Bed to Chair: Moderate assistance  Stand Pivot Transfers: Moderate Assistance  Pt unable to advance RLE. Ambulation  Ambulation?: Yes  Ambulation 1  Surface: level tile  Device: Rolling Walker  Assistance: Moderate assistance  Distance: Pt unable to take steps with RLE. Pivoted slightly and chair was brought up behind her. Pt was then transferred chair<> commode with [place]. Min A +2  Balance  Sitting - Static: Good (-)  Sitting - Dynamic: Fair     Assessment   Body structures, Functions, Activity limitations: Decreased functional mobility ; Decreased endurance;Decreased balance;Decreased cognition;Decreased safe awareness  Prognosis: Good  Decision Making: Medium Complexity  Patient Education: PT POC  Barriers to Learning: Dementia  REQUIRES PT FOLLOW UP: Yes  Activity Tolerance  Activity Tolerance: Patient limited by cognitive status; Patient limited by endurance; Patient limited by pain         Plan   Plan  Times per week:

## 2018-09-06 NOTE — PROGRESS NOTES
impairments; Left UE;Right UE     Bed mobility  Supine to Sit: Moderate assistance  Sit to Supine: Maximum assistance  Scooting: Moderate assistance  Transfers  Sit to stand: Maximum assistance  Stand to sit: Moderate assistance     Cognition  Overall Cognitive Status: Exceptions  Attention Span: Difficulty attending to directions; Difficulty dividing attention  Memory: Decreased recall of recent events;Decreased short term memory  Safety Judgement: Decreased awareness of need for assistance  Problem Solving: Assistance required to generate solutions;Assistance required to implement solutions;Assistance required to correct errors made;Decreased awareness of errors  Insights: Decreased awareness of deficits  Initiation: Requires cues for some  Sequencing: Requires cues for some  Cognition Comment: Baseline dementia, unable to remeber how brakes work on personal walker, disoriented        Sensation  Overall Sensation Status: WFL        LUE AROM (degrees)  LUE AROM : Exceptions  L Shoulder Flexion 0-180: Limited to 90 degrees  RUE AROM (degrees)  RUE AROM : Exceptions  R Shoulder Flexion 0-180: Limited to 90 degrees  LUE Strength  Gross LUE Strength: Exceptions to [facility]  L Shoulder Flex: 3-/5  L Elbow Flex: 4/5  L Elbow Ext: 4/5  L Hand Grasp: 4+/5  L Hand Release: 4+/5  RUE Strength  Gross RUE Strength: Exceptions to [facility]  R Shoulder Flex: 3-/5  R Elbow Flex: 4/5  R Elbow Ext: 4/5  R Hand Grasp: 4+/5  R Hand Release: 4+/5     Assessment   Performance deficits / Impairments: Decreased functional mobility ; Decreased ADL status; Decreased strength;Decreased safe awareness;Decreased cognition;Decreased high-level IADLs;Decreased balance;Decreased endurance  Prognosis: Fair;Guarded  Decision Making: Medium Complexity  Patient Education: Safety awareness, OTPOC, discharge rec, proper use of 4ww-poor return d/t dementia  REQUIRES OT FOLLOW UP: Yes  Activity Tolerance  Activity Tolerance: Treatment limited secondary to decreased cognition;Patient limited by fatigue;Patient limited by pain  Safety Devices  Safety Devices in place: Yes  Type of devices: Left in bed; All fall risk precautions in place; Bed alarm in place;Call light within reach;Nurse notified;Gait belt  Restraints  Initially in place: No         Plan   Plan  Times per week: 2-3x  Current Treatment Recommendations: Balance Training, Functional Mobility Training, Endurance Training, Safety Education & Training, Self-Care / ADL, Home Management Training, Patient/Caregiver Education & Training, Equipment Evaluation, Education, & procurement, Cognitive Reorientation, Pain Management, Strengthening    G-Code  OT G-codes  Functional Assessment Tool Used: [facility]  Score: 13/24  Functional Limitation: Self care  Self Care Current Status (): At least 60 percent but less than 80 percent impaired, limited or restricted  Self Care Goal Status (): At least 40 percent but less than 60 percent impaired, limited or restricted  How much help from another person does the pt currently need? Unable A Lot A Little None   1. Putting on and taking off regular lower body clothing? 1      2      3       4   2. Bathing (including washing, rinsing, drying)? 1      2      3      4   3. Toileting, which includes using toilet, bedpan, or urinal?      1      2        3      4   4. Putting on and taking off regular upper body clothing? 1      2      3       4   5. Taking care of personal grooming such as brushing teeth? 1      2      3      4   6. Eating meals? 1      2       3       4     1. Unable = Total/Dependent Assist  2. A Lot = Maximum/Moderate Assist  3. A Little = Minimum/Contact Guard Assist/Supervision  4.  None= Modified Sebastian/Independent    Raw Score Scale Score Scale Score Standard Error Approximate Degree of Functional Impairment     6 17.07 3.74 100%   7 20.13 3.68 92%   8 22.86 3.43 86%   9 25.33 3.17 80%   10 27.31 2.96 75%   11 29.04 2.79 70% 12 30.60 2.68 67%   13 32.03 2.62 63%   14 33.39 2.61 60%   15 34.69 2.65 56%   16 35.96 2.71 53%   17 37.26 2.82 50%   18 38.66 2.97 47%   19 40.22 3.20 43%   20 42.03 3.55 38%   21 44.27 4.08 33%   22 47.10 4.81 26%   23 51.12 5.88 16%   24 57.54 7.36 0%       Goals  Short term goals  Time Frame for Short term goals: Pt will by discharge   Short term goal 1: demo supine<>sit transfer to EOB with CGA only  Short term goal 2: demo A&Ox2 for 3/4 tx sessions  Short term goal 3: demo fair safety awareness during func mob around room with personal 4ww and mod A  Short term goal 4: demo standing during 2-step func activity for 7 min with LRD and min A       Therapy Time   Individual Concurrent Group Co-treatment   Time In 1550         Time Out 1622         Minutes 32            Discharge recommendations discussed with patient during initial evaluation.   Pt would benefit from additional acute care and post-acute care therapy services prior to a safe discharge home to address ADL/IADL completion, func mob/transfers, safety awareness, etc.      RHIANNA Jasmine/FELICITAS

## 2018-09-06 NOTE — CONSULTS
[address] / HISTORY AND PHYSICAL EXAMINATION            Date:   9/6/2018  Patient name:  Kaye Anderson  Date of admission:  9/4/2018 10:42 PM  MRN:   [id number]  Account:  [de-identified]  YOB: 1940  PCP:    Yoanna Ga  Room:   2270/8829-39  Code Status:    Full Code    Physician Requesting Consult: Nancy Jones MD    Reason for Consult:  Bilateral Pleural effusion on echo    Chief Complaint:     `Transient confusion    History Obtained From:     patient, electronic medical record    History of Present Illness:     Admitted under neurology service with following history; The patient is a 77 y.o.   female  wiyh  history of a left parietal stroke left subarachnoid hemorrhage and dementia , who presents with No chief complaint on file. and she is admitted to the hospital for the management of  Prior Subarachnoid hemorrhage , TIA. The patient was treated at [facility] 1 week ago for a subarachnoid hemorrhage and left parietal stroke. Tuesday morning,  the patient presented to [facility] with symptoms of transient confusion. NIH at [facility]  was 0 .   She was transferred to [facility] for further evaluation     2-D echo was done as per workup protocol which notably has been verbally reported to be showing bilateral pleural effusion for which this consult has been requested to evaluate further  She is a poor historian due to dementia but denies any chest pain/palpitation/shortness of breath    Past Medical History:     Past Medical History:   Diagnosis Date    Cancer [facility])     Breast ca/colon ca    [facility]     Dementia 9/5/2018    Dementia     Diabetes mellitus ([facility]     Hypertension     SAH (subarachnoid hemorrhage) ([facility] 9/5/2018    Thyroid disease     gouter        Past Surgical History:     Past Surgical History:

## 2018-09-07 NOTE — PROGRESS NOTES
self feeding upon arrival. Daughter in room requesting a shower for practice. pt's daughter expressed the desire to have her mother return back home         Time Out  Returned at 12:15-1:35 for pt to complete shower.          75 MIN Perry/L

## 2018-09-07 NOTE — PROGRESS NOTES
NEUROLOGY INPATIENT PROGRESS NOTE    9/7/2018         Subjective: Cody Frederick is a  77 y.o. female admitted on 9/4/2018 with Altered mental status [R41.82]      Briefly, this is a  77 y.o. female admitted on 9/4/2018 with altered mentation. Per the records the patient was treated at [facility] one week ago for [facility] and left parietal stroke. On Tuesday morning the patient presented to [facility] with symptoms of transient confusion; NIH was 0 and she was transferred to [facility] for further evaluation. MRI brain was done showing a punctate acute ischemic infarct within the left medial temporal lobe; reviewed with Dr. Mp Garcia and infarct should be essentially asymptomatic and not related to any changes in the patient's mental status. CTA head and neck with no acute abnormality or flow limiting stenosis.   MRI done showed punctate acute ischemic infarct within left temporal lobe is so small, in this location, that the infarct is considered to have been most likely asymptomatic even though radiographically acute     Patient on aspirin 81 g,Crestor 10mg and Toprol 25 mg. As per reviweed patient doesn't need an EEG and no need for AED as well and doesn't feel the patient has subclinical seizures. Patient family wants her to be discharged to be home and daughter wants to take care of her. Patient is evaluated by PT who recommended SNF ,referral are made awaiting acceptance, will continue to evan the patient in meanwhile     Today (9/7/2018) patient evaluated at bedside with daughter. No acute events during the night, Waiting for SNF contact    No current facility-administered medications on file prior to encounter. No current outpatient prescriptions on file prior to encounter. Allergies: Cody Frederick has no allergies on file.     Past Medical History:   Diagnosis Date    Cancer [facility])     Breast ca/colon ca    Cerebral artery occlusion with cerebral infarction ([facility]     Dementia Physician      Wilian Villeda      Fellow                   Referring Nurse                            Practitioner      Interpreting             Referring Physician         Mayte Santos MD   Fellow     Type of Study      TTE procedure:2D Echocardiogram, M-Mode, Doppler, Color Doppler.     Procedure Date  Date: 09/06/2018 Start: 02:23 PM    Study Location: [facility]  Technical Quality: Adequate visualization    Indications:CVA. Critical Result: Lexy NEWBERRY notified of findings @ 3:00PM    History / Tech. Comments:  Procedure explained to patient. HTN, HLP, DM  PMHx: Malignant neoplasm of ascending colon and breast.    Patient Status: STAT    Height: 61 inches Weight: 155 pounds BSA: 1.7 m^2 BMI: 29.29 kg/m^2    CONCLUSIONS    Summary  Left ventricle is normal in size, global left ventricular systolic function  is normal, estimated ejection fraction is 55%. Grade I (mild) left ventricular diastolic dysfunction. Left atrial dilatation. No significant valvular regurgitation or stenosis seen. Moderate anterior pericardial effusion, no hemodynamic signs of tamponade. Moderate bilateral pleural effusion. Signature  ----------------------------------------------------------------------------   Electronically signed by Luis Aleman on 09/06/2018 04:37   PM  ----------------------------------------------------------------------------    ----------------------------------------------------------------------------   Electronically signed by Wilian Villeda(Interpreting physician) on 09/06/2018   08:10 PM  ----------------------------------------------------------------------------  FINDINGS  Left Atrium  Left atrial dilatation. Left Ventricle  Left ventricle is normal in size, global left ventricular systolic function  is normal, estimated ejection fraction is 55%. Grade I (mild) left ventricular diastolic dysfunction. Right Atrium  Right atrium is normal in size.   Right Ventricle  Normal

## 2018-09-09 NOTE — PROGRESS NOTES
Patient finally awake to eat breakfast. Daughter is at bedside. Daughter opens breakfast tray. ..there is a sausage, onion, among other foods, omelet, coffee, and banana. This is not what the daughter ordered for the mother (who lives with her and is confused/dementia). Writer proceeds to call dietary ambassador number that was sent to the floor this morning. All writer gets is a beeping sound like it is a wrong number. HUC tries also. Writer attempts to call multiple numbers given to writer by hospital  without success, other than leaving voice messages for about an hour. Writer finally able to get a hold of dietary manager at 1030. Explained situation/frustration. Came up and talked with patient/daughter.

## 2018-09-09 NOTE — PROGRESS NOTES
infarction ([facility]     Dementia 9/5/2018    Dementia     Diabetes mellitus ([facility]     Hypertension     SAH (subarachnoid hemorrhage) ([facility] 9/5/2018    Thyroid disease     hima     Past Surgical History:   Procedure Laterality Date    BREAST SURGERY      lumpectomy    HYSTERECTOMY      SMALL INTESTINE SURGERY       Medications:     chlorhexidine  15 mL Mouth/Throat BID    aspirin  81 mg Oral Daily    pneumococcal 13-valent conjugate  0.5 mL Intramuscular Once    levothyroxine  37.5 mcg Oral Daily    sodium chloride flush  10 mL Intravenous 2 times per day    enoxaparin  40 mg Subcutaneous Daily    rosuvastatin  10 mg Oral Nightly    metoprolol succinate  25 mg Oral Daily    citalopram  10 mg Oral Daily    lisinopril  2.5 mg Oral Daily    lidocaine  1 patch Transdermal Daily     PRN Meds include: docusate, acetaminophen, sodium chloride flush, magnesium hydroxide, ondansetron, calcium carbonate    Objective:   /63   Pulse 59   Temp 97.4 °F (36.3 °C) (Oral)   Resp 15   Ht 5' 1\" (1.549 m)   Wt 155 lb 13.8 oz (70.7 kg)   SpO2 99%   BMI 29.45 kg/m²     Blood pressure range: Systolic (33GSY), SOH:695 , Min:82 , CAC:281   ; Diastolic (64DNM), SMM:30, Min:45, Max:68    NEUROLOGIC EXAMINATION  GENERAL  Appears comfortable and in no distress   HEENT  NC/ AT   cardiovascular  S1 and S2 heard; palpation of pulses: radial pulse    NECK  Supple and no bruits heard   MENTAL STATUS:  Alert, oriented to name,dementia wax & wane, normal speech, normal language, no hallucination or delusion   CRANIAL NERVES: II     -      Visual fields intact to confrontation  III,IV,VI -  PERR, EOMs full, no ptosis  V     -     Normal facial sensation  VII    -     Normal facial symmetry  VIII   -     Intact hearing   IX,X -     Symmetrical palate  XI    -     Symmetrical shoulder shrug  XII   -     Midline tongue, no atrophy   MOTOR FUNCTION:  significant for good strength of grade 5/5 in bilateral proximal and distal

## 2018-09-10 NOTE — PROCEDURES
metoprolol succinate (TOPROL XL) extended release tablet 25 mg  25 mg Oral Daily Keatonris-Judie Catia, DO   25 mg at 09/10/18 1012    citalopram (CELEXA) tablet 10 mg  10 mg Oral Daily Jerris-Judie Catia, DO   10 mg at 09/10/18 1012    calcium carbonate (TUMS) chewable tablet 500 mg  500 mg Oral TID PRN Keatonris-Judie Catia, DO   500 mg at 09/05/18 1349    lidocaine (LIDODERM) 5 % 1 patch  1 patch Transdermal Daily Keatonris-Judie Catia, DO   1 patch at 09/09/18 3839        Technical Description: This is a 21 channel digital EEG recording with time-locked video. Electrodes were placed in accordance with the 10-20 International System of Electrode Placement. Single lead EKG monitoring as well as temporal electrodes were included. The patient was not sleep deprived. This recording was obtained during wakefulness. EEG Description: The dominant background activity during maximal recorded wakefulness consisted of bioccipitally dominant 9-10 Hz, 25-35 uV, better formed over the right hemisphere. The left hemisphere showed polymorphic 6-7 Hz of theta frequency. This is asymmetry between the 2 hemispheres persisted throughout the recording. Occasional left central parietal sharp waves were seen. Sleep was not seen. Activation procedures were not performed. The EKG channel demonstrated a normal sinus rhythm. Interpretation  This EEG was abnormal.  Near continuous left central parietal polymorphic delta and theta slowing was seen. Occasional left central parietal sharp waves were seen. Clinical correlation  This EEG was abnormal.  Left central parietal slowing suggested underlying structural defect. Left central parietal sharp waves conferred increased risk for focal onset seizure.     Josue Mcguire MD  Epilepsy Attending

## 2018-09-10 NOTE — PROGRESS NOTES
Moderate Assistance  Comment: verbal cues for sequencing   Ambulation  Ambulation?: Yes  Ambulation 1  Surface: level tile  Device: Rolling Walker  Assistance: Moderate assistance  Quality of Gait: verbal cues for advancement of RLE, decreased regine and step length  Distance: 10' x2 chair to restroom   Comments: max verbal cues for sequencing and hand placement  Stairs/Curb  Stairs?: No     Balance  Posture: Fair  Sitting - Static: Fair;+  Sitting - Dynamic: Fair  Standing - Static: Fair  Standing - Dynamic: Fair  Comments: Standing balance assessed with RW       Exercise  Seated LE exercise program: Long Arc Quads, hip abduction/adduction, heel/toe raises, and marches. Reps: 20x2  Pt eating lunch after returning from EEG remainder of exercises deferred     Assessment   Body structures, Functions, Activity limitations: Decreased functional mobility ; Decreased endurance;Decreased balance;Decreased cognition;Decreased safe awareness  Assessment: Pt amb 10' x2 with RW, in room requiring modA and max verbal cues for sequencing and safety awareness. Pt to benefit from continued therapy to improve functional mobility and endurance. Prognosis: Good  REQUIRES PT FOLLOW UP: Yes  Activity Tolerance  Activity Tolerance: Patient limited by cognitive status; Patient limited by pain     Goals  Short term goals  Time Frame for Short term goals: 14 visits  Short term goal 1: Bed mobility SBA  Short term goal 2: Transfers CGA  Short term goal 3: Ambulate with 4 wheeled John 100ft  Short term goal 4: Pt to tolerate 30 minutes of activity to improve mobility  Patient Goals   Patient goals : Daughter: Get Mom walking again.     Plan    Plan  Times per week: 5-6x/week  Current Treatment Recommendations: Strengthening, Functional Mobility Training, Transfer Training, Endurance Training, Gait Training, Cognitive Reorientation, Safety Education & Training  Safety Devices  Type of devices: Call light within reach, Gait belt, Left in chair, Nurse notified  Restraints  Initially in place: No     Therapy Time   Individual Concurrent Group Co-treatment   Time In 0328         Time Out 0417         Minutes 52                 [address]  Treatment performed by Student PTA under the supervision of co-signing PTA who agrees with all treatment and documentation.    Knyudith Bonilla, PTA

## 2018-09-19 PROBLEM — R56.9 SEIZURE (HCC): Status: ACTIVE | Noted: 2018-01-01

## 2019-01-01 ENCOUNTER — APPOINTMENT (OUTPATIENT)
Dept: GENERAL RADIOLOGY | Age: 79
DRG: 064 | End: 2019-01-01
Payer: MEDICARE

## 2019-01-01 ENCOUNTER — TELEPHONE (OUTPATIENT)
Dept: NEUROLOGY | Age: 79
End: 2019-01-01

## 2019-01-01 ENCOUNTER — HOSPITAL ENCOUNTER (INPATIENT)
Age: 79
LOS: 1 days | DRG: 064 | End: 2019-09-02
Attending: EMERGENCY MEDICINE | Admitting: PSYCHIATRY & NEUROLOGY
Payer: MEDICARE

## 2019-01-01 ENCOUNTER — APPOINTMENT (OUTPATIENT)
Dept: CT IMAGING | Age: 79
DRG: 064 | End: 2019-01-01
Payer: MEDICARE

## 2019-01-01 ENCOUNTER — OFFICE VISIT (OUTPATIENT)
Dept: NEUROLOGY | Age: 79
End: 2019-01-01
Payer: MEDICARE

## 2019-01-01 VITALS
BODY MASS INDEX: 28.12 KG/M2 | RESPIRATION RATE: 23 BRPM | DIASTOLIC BLOOD PRESSURE: 61 MMHG | SYSTOLIC BLOOD PRESSURE: 142 MMHG | WEIGHT: 143.96 LBS | TEMPERATURE: 97.7 F | OXYGEN SATURATION: 100 % | HEART RATE: 91 BPM

## 2019-01-01 VITALS — DIASTOLIC BLOOD PRESSURE: 71 MMHG | SYSTOLIC BLOOD PRESSURE: 122 MMHG | HEART RATE: 73 BPM

## 2019-01-01 DIAGNOSIS — Z86.73 HISTORY OF STROKE: ICD-10-CM

## 2019-01-01 DIAGNOSIS — G40.109 FOCAL EPILEPSY (HCC): ICD-10-CM

## 2019-01-01 DIAGNOSIS — I61.9 RIGHT-SIDED NONTRAUMATIC INTRACEREBRAL HEMORRHAGE, UNSPECIFIED CEREBRAL LOCATION (HCC): Primary | ICD-10-CM

## 2019-01-01 DIAGNOSIS — F03.C0 ADVANCED DEMENTIA: Primary | ICD-10-CM

## 2019-01-01 LAB
% CKMB: 4.7 % (ref 0–3)
-: NORMAL
-: NORMAL
ABSOLUTE EOS #: 0.12 K/UL (ref 0–0.44)
ABSOLUTE EOS #: ABNORMAL K/UL
ABSOLUTE IMMATURE GRANULOCYTE: 0.11 K/UL (ref 0–0.3)
ABSOLUTE IMMATURE GRANULOCYTE: ABNORMAL K/UL (ref 0–0.3)
ABSOLUTE LYMPH #: 1.61 K/UL (ref 1.1–3.7)
ABSOLUTE LYMPH #: ABNORMAL K/UL
ABSOLUTE MONO #: 1.29 K/UL (ref 0.1–1.2)
ABSOLUTE MONO #: ABNORMAL K/UL
ALLEN TEST: POSITIVE
ANION GAP SERPL CALCULATED.3IONS-SCNC: 13 MMOL/L (ref 9–17)
BASOPHILS # BLD: 0 % (ref 0–2)
BASOPHILS # BLD: ABNORMAL %
BASOPHILS ABSOLUTE: 0.08 K/UL (ref 0–0.2)
BASOPHILS ABSOLUTE: ABNORMAL K/UL (ref 0–0.2)
BUN BLDV-MCNC: 13 MG/DL (ref 8–23)
BUN/CREAT BLD: ABNORMAL (ref 9–20)
CALCIUM SERPL-MCNC: 8 MG/DL (ref 8.6–10.4)
CHLORIDE BLD-SCNC: 102 MMOL/L (ref 98–107)
CK MB: 4.3 NG/ML
CKMB INTERPRETATION: ABNORMAL
CO2: 21 MMOL/L (ref 20–31)
CREAT SERPL-MCNC: 0.5 MG/DL (ref 0.5–0.9)
DIFFERENTIAL TYPE: ABNORMAL
DIFFERENTIAL TYPE: ABNORMAL
EOSINOPHILS RELATIVE PERCENT: 1 % (ref 1–4)
EOSINOPHILS RELATIVE PERCENT: ABNORMAL %
FIO2: 30
GFR AFRICAN AMERICAN: >60 ML/MIN
GFR NON-AFRICAN AMERICAN: >60 ML/MIN
GFR SERPL CREATININE-BSD FRML MDRD: ABNORMAL ML/MIN/{1.73_M2}
GFR SERPL CREATININE-BSD FRML MDRD: ABNORMAL ML/MIN/{1.73_M2}
GLUCOSE BLD-MCNC: 193 MG/DL (ref 70–99)
HCT VFR BLD CALC: 43 % (ref 36.3–47.1)
HCT VFR BLD CALC: ABNORMAL %
HEMOGLOBIN: 13.6 G/DL (ref 11.9–15.1)
HEMOGLOBIN: ABNORMAL G/DL
IMMATURE GRANULOCYTES: 1 %
IMMATURE GRANULOCYTES: ABNORMAL %
INR BLD: 0.9
INR BLD: ABNORMAL
LYMPHOCYTES # BLD: 9 % (ref 24–43)
LYMPHOCYTES # BLD: ABNORMAL %
MCH RBC QN AUTO: 28.3 PG (ref 25.2–33.5)
MCH RBC QN AUTO: ABNORMAL PG
MCHC RBC AUTO-ENTMCNC: 31.6 G/DL (ref 28.4–34.8)
MCHC RBC AUTO-ENTMCNC: ABNORMAL G/DL
MCV RBC AUTO: 89.4 FL (ref 82.6–102.9)
MCV RBC AUTO: ABNORMAL FL
MODE: AC
MONOCYTES # BLD: 7 % (ref 3–12)
MONOCYTES # BLD: ABNORMAL %
MRSA, DNA, NASAL: NORMAL
MYOGLOBIN: 143 NG/ML (ref 25–58)
NEGATIVE BASE EXCESS, ART: ABNORMAL (ref 0–2)
NRBC AUTOMATED: 0 PER 100 WBC
NRBC AUTOMATED: ABNORMAL PER 100 WBC
O2 DEVICE/FLOW/%: ABNORMAL
PARTIAL THROMBOPLASTIN TIME: 17.2 SEC (ref 20.5–30.5)
PARTIAL THROMBOPLASTIN TIME: ABNORMAL SEC
PATIENT TEMP: ABNORMAL
PDW BLD-RTO: 14.5 % (ref 11.8–14.4)
PDW BLD-RTO: ABNORMAL %
PLATELET # BLD: ABNORMAL K/UL
PLATELET # BLD: ABNORMAL K/UL (ref 138–453)
PLATELET ESTIMATE: ABNORMAL
PLATELET ESTIMATE: ABNORMAL
PLATELET, FLUORESCENCE: NORMAL K/UL (ref 138–453)
PMV BLD AUTO: ABNORMAL FL
PMV BLD AUTO: ABNORMAL FL (ref 8.1–13.5)
POC HCO3: 24.3 MMOL/L (ref 21–28)
POC O2 SATURATION: 100 % (ref 94–98)
POC PCO2 TEMP: ABNORMAL MM HG
POC PCO2: 30.2 MM HG (ref 35–48)
POC PH TEMP: ABNORMAL
POC PH: 7.51 (ref 7.35–7.45)
POC PO2 TEMP: ABNORMAL MM HG
POC PO2: 148.3 MM HG (ref 83–108)
POSITIVE BASE EXCESS, ART: 2 (ref 0–3)
POTASSIUM SERPL-SCNC: 4.1 MMOL/L (ref 3.7–5.3)
PROTHROMBIN TIME: 10 SEC (ref 9–12)
PROTHROMBIN TIME: ABNORMAL SEC
RBC # BLD: 4.81 M/UL (ref 3.95–5.11)
RBC # BLD: ABNORMAL 10*6/UL
RBC # BLD: ABNORMAL 10*6/UL
RBC # BLD: ABNORMAL M/UL
REASON FOR REJECTION: NORMAL
REASON FOR REJECTION: NORMAL
SAMPLE SITE: ABNORMAL
SEG NEUTROPHILS: 83 % (ref 36–65)
SEG NEUTROPHILS: ABNORMAL %
SEGMENTED NEUTROPHILS ABSOLUTE COUNT: 15.65 K/UL (ref 1.5–8.1)
SEGMENTED NEUTROPHILS ABSOLUTE COUNT: ABNORMAL K/UL
SODIUM BLD-SCNC: 136 MMOL/L (ref 135–144)
SODIUM BLD-SCNC: 139 MMOL/L (ref 135–144)
SPECIMEN DESCRIPTION: NORMAL
TCO2 (CALC), ART: 25 MMOL/L (ref 22–29)
TOTAL CK: 92 U/L (ref 26–192)
TROPONIN INTERP: ABNORMAL
TROPONIN INTERP: ABNORMAL
TROPONIN T: ABNORMAL NG/ML
TROPONIN T: ABNORMAL NG/ML
TROPONIN, HIGH SENSITIVITY: 266 NG/L (ref 0–14)
TROPONIN, HIGH SENSITIVITY: 275 NG/L (ref 0–14)
WBC # BLD: 18.9 K/UL (ref 3.5–11.3)
WBC # BLD: ABNORMAL 10*3/UL
WBC # BLD: ABNORMAL 10*3/UL
WBC # BLD: ABNORMAL K/UL
ZZ NTE CLEAN UP: ORDERED TEST: NORMAL
ZZ NTE CLEAN UP: ORDERED TEST: NORMAL
ZZ NTE WITH NAME CLEAN UP: SPECIMEN SOURCE: NORMAL
ZZ NTE WITH NAME CLEAN UP: SPECIMEN SOURCE: NORMAL

## 2019-01-01 PROCEDURE — 2580000003 HC RX 258: Performed by: PSYCHIATRY & NEUROLOGY

## 2019-01-01 PROCEDURE — 70496 CT ANGIOGRAPHY HEAD: CPT

## 2019-01-01 PROCEDURE — 1101F PT FALLS ASSESS-DOCD LE1/YR: CPT | Performed by: PSYCHIATRY & NEUROLOGY

## 2019-01-01 PROCEDURE — 6360000004 HC RX CONTRAST MEDICATION: Performed by: STUDENT IN AN ORGANIZED HEALTH CARE EDUCATION/TRAINING PROGRAM

## 2019-01-01 PROCEDURE — 82550 ASSAY OF CK (CPK): CPT

## 2019-01-01 PROCEDURE — 99238 HOSP IP/OBS DSCHRG MGMT 30/<: CPT | Performed by: PSYCHIATRY & NEUROLOGY

## 2019-01-01 PROCEDURE — 99291 CRITICAL CARE FIRST HOUR: CPT

## 2019-01-01 PROCEDURE — 83874 ASSAY OF MYOGLOBIN: CPT

## 2019-01-01 PROCEDURE — 36600 WITHDRAWAL OF ARTERIAL BLOOD: CPT

## 2019-01-01 PROCEDURE — 84295 ASSAY OF SERUM SODIUM: CPT

## 2019-01-01 PROCEDURE — 6360000002 HC RX W HCPCS

## 2019-01-01 PROCEDURE — 36415 COLL VENOUS BLD VENIPUNCTURE: CPT

## 2019-01-01 PROCEDURE — 82803 BLOOD GASES ANY COMBINATION: CPT

## 2019-01-01 PROCEDURE — 85055 RETICULATED PLATELET ASSAY: CPT

## 2019-01-01 PROCEDURE — 2500000003 HC RX 250 WO HCPCS: Performed by: PSYCHIATRY & NEUROLOGY

## 2019-01-01 PROCEDURE — 94770 HC ETCO2 MONITOR DAILY: CPT

## 2019-01-01 PROCEDURE — 94761 N-INVAS EAR/PLS OXIMETRY MLT: CPT

## 2019-01-01 PROCEDURE — G8484 FLU IMMUNIZE NO ADMIN: HCPCS | Performed by: PSYCHIATRY & NEUROLOGY

## 2019-01-01 PROCEDURE — G8417 CALC BMI ABV UP PARAM F/U: HCPCS | Performed by: PSYCHIATRY & NEUROLOGY

## 2019-01-01 PROCEDURE — 99223 1ST HOSP IP/OBS HIGH 75: CPT | Performed by: PSYCHIATRY & NEUROLOGY

## 2019-01-01 PROCEDURE — 80048 BASIC METABOLIC PNL TOTAL CA: CPT

## 2019-01-01 PROCEDURE — G8400 PT W/DXA NO RESULTS DOC: HCPCS | Performed by: PSYCHIATRY & NEUROLOGY

## 2019-01-01 PROCEDURE — G8598 ASA/ANTIPLAT THER USED: HCPCS | Performed by: PSYCHIATRY & NEUROLOGY

## 2019-01-01 PROCEDURE — 84484 ASSAY OF TROPONIN QUANT: CPT

## 2019-01-01 PROCEDURE — 82553 CREATINE MB FRACTION: CPT

## 2019-01-01 PROCEDURE — 70450 CT HEAD/BRAIN W/O DYE: CPT

## 2019-01-01 PROCEDURE — 85025 COMPLETE CBC W/AUTO DIFF WBC: CPT

## 2019-01-01 PROCEDURE — 6360000002 HC RX W HCPCS: Performed by: STUDENT IN AN ORGANIZED HEALTH CARE EDUCATION/TRAINING PROGRAM

## 2019-01-01 PROCEDURE — 85730 THROMBOPLASTIN TIME PARTIAL: CPT

## 2019-01-01 PROCEDURE — 96365 THER/PROPH/DIAG IV INF INIT: CPT

## 2019-01-01 PROCEDURE — 85610 PROTHROMBIN TIME: CPT

## 2019-01-01 PROCEDURE — 94002 VENT MGMT INPAT INIT DAY: CPT

## 2019-01-01 PROCEDURE — 1036F TOBACCO NON-USER: CPT | Performed by: PSYCHIATRY & NEUROLOGY

## 2019-01-01 PROCEDURE — 1200000000 HC SEMI PRIVATE

## 2019-01-01 PROCEDURE — 2700000000 HC OXYGEN THERAPY PER DAY

## 2019-01-01 PROCEDURE — 4040F PNEUMOC VAC/ADMIN/RCVD: CPT | Performed by: PSYCHIATRY & NEUROLOGY

## 2019-01-01 PROCEDURE — 99222 1ST HOSP IP/OBS MODERATE 55: CPT | Performed by: FAMILY MEDICINE

## 2019-01-01 PROCEDURE — G8427 DOCREV CUR MEDS BY ELIG CLIN: HCPCS | Performed by: PSYCHIATRY & NEUROLOGY

## 2019-01-01 PROCEDURE — 71045 X-RAY EXAM CHEST 1 VIEW: CPT

## 2019-01-01 PROCEDURE — 1090F PRES/ABSN URINE INCON ASSESS: CPT | Performed by: PSYCHIATRY & NEUROLOGY

## 2019-01-01 PROCEDURE — 1123F ACP DISCUSS/DSCN MKR DOCD: CPT | Performed by: PSYCHIATRY & NEUROLOGY

## 2019-01-01 PROCEDURE — 87641 MR-STAPH DNA AMP PROBE: CPT

## 2019-01-01 PROCEDURE — 99214 OFFICE O/P EST MOD 30 MIN: CPT | Performed by: PSYCHIATRY & NEUROLOGY

## 2019-01-01 RX ORDER — MANNITOL 20 G/100ML
1 INJECTION, SOLUTION INTRAVENOUS ONCE
Status: COMPLETED | OUTPATIENT
Start: 2019-01-01 | End: 2019-01-01

## 2019-01-01 RX ORDER — ONDANSETRON 2 MG/ML
4 INJECTION INTRAMUSCULAR; INTRAVENOUS EVERY 6 HOURS PRN
Status: DISCONTINUED | OUTPATIENT
Start: 2019-01-01 | End: 2019-01-01 | Stop reason: HOSPADM

## 2019-01-01 RX ORDER — ACETAMINOPHEN 325 MG/1
650 TABLET ORAL EVERY 4 HOURS PRN
Status: DISCONTINUED | OUTPATIENT
Start: 2019-01-01 | End: 2019-01-01

## 2019-01-01 RX ORDER — PROPOFOL 10 MG/ML
10 INJECTION, EMULSION INTRAVENOUS
Status: DISCONTINUED | OUTPATIENT
Start: 2019-01-01 | End: 2019-01-01 | Stop reason: SDUPTHER

## 2019-01-01 RX ORDER — LEVETIRACETAM 5 MG/ML
500 INJECTION INTRAVASCULAR ONCE
Status: COMPLETED | OUTPATIENT
Start: 2019-01-01 | End: 2019-01-01

## 2019-01-01 RX ORDER — LEVETIRACETAM 250 MG/1
250 TABLET ORAL 2 TIMES DAILY
Qty: 60 TABLET | Refills: 5 | Status: SHIPPED | OUTPATIENT
Start: 2019-01-01

## 2019-01-01 RX ORDER — LEVETIRACETAM 5 MG/ML
500 INJECTION INTRAVASCULAR EVERY 12 HOURS
Status: DISCONTINUED | OUTPATIENT
Start: 2019-01-01 | End: 2019-01-01

## 2019-01-01 RX ORDER — LORAZEPAM 2 MG/ML
2 INJECTION INTRAMUSCULAR EVERY 4 HOURS PRN
Status: DISCONTINUED | OUTPATIENT
Start: 2019-01-01 | End: 2019-01-01

## 2019-01-01 RX ORDER — MORPHINE SULFATE 4 MG/ML
4 INJECTION, SOLUTION INTRAMUSCULAR; INTRAVENOUS
Status: DISCONTINUED | OUTPATIENT
Start: 2019-01-01 | End: 2019-01-01

## 2019-01-01 RX ORDER — PROPOFOL 10 MG/ML
10 INJECTION, EMULSION INTRAVENOUS
Status: DISCONTINUED | OUTPATIENT
Start: 2019-01-01 | End: 2019-01-01 | Stop reason: HOSPADM

## 2019-01-01 RX ORDER — SODIUM CHLORIDE 0.9 % (FLUSH) 0.9 %
10 SYRINGE (ML) INJECTION EVERY 12 HOURS SCHEDULED
Status: DISCONTINUED | OUTPATIENT
Start: 2019-01-01 | End: 2019-01-01 | Stop reason: HOSPADM

## 2019-01-01 RX ORDER — 0.9 % SODIUM CHLORIDE 0.9 %
1000 INTRAVENOUS SOLUTION INTRAVENOUS ONCE
Status: COMPLETED | OUTPATIENT
Start: 2019-01-01 | End: 2019-01-01

## 2019-01-01 RX ORDER — MEMANTINE HYDROCHLORIDE 28 MG/1
CAPSULE, EXTENDED RELEASE ORAL
Qty: 30 CAPSULE | Refills: 5 | Status: SHIPPED | OUTPATIENT
Start: 2019-01-01

## 2019-01-01 RX ORDER — SODIUM CHLORIDE 0.9 % (FLUSH) 0.9 %
10 SYRINGE (ML) INJECTION PRN
Status: DISCONTINUED | OUTPATIENT
Start: 2019-01-01 | End: 2019-01-01 | Stop reason: HOSPADM

## 2019-01-01 RX ORDER — MANNITOL 250 MG/ML
INJECTION, SOLUTION INTRAVENOUS
Status: DISPENSED
Start: 2019-01-01 | End: 2019-01-01

## 2019-01-01 RX ORDER — PROPOFOL 10 MG/ML
INJECTION, EMULSION INTRAVENOUS CONTINUOUS PRN
Status: COMPLETED | OUTPATIENT
Start: 2019-01-01 | End: 2019-01-01

## 2019-01-01 RX ORDER — GLYCOPYRROLATE 0.2 MG/ML
0.1 INJECTION INTRAMUSCULAR; INTRAVENOUS PRN
Status: DISCONTINUED | OUTPATIENT
Start: 2019-01-01 | End: 2019-01-01 | Stop reason: HOSPADM

## 2019-01-01 RX ORDER — MORPHINE SULFATE 4 MG/ML
4 INJECTION, SOLUTION INTRAMUSCULAR; INTRAVENOUS
Status: DISCONTINUED | OUTPATIENT
Start: 2019-01-01 | End: 2019-01-01 | Stop reason: HOSPADM

## 2019-01-01 RX ORDER — LORAZEPAM 2 MG/ML
2 INJECTION INTRAMUSCULAR
Status: DISCONTINUED | OUTPATIENT
Start: 2019-01-01 | End: 2019-01-01 | Stop reason: HOSPADM

## 2019-01-01 RX ORDER — PROPOFOL 10 MG/ML
INJECTION, EMULSION INTRAVENOUS
Status: DISPENSED
Start: 2019-01-01 | End: 2019-01-01

## 2019-01-01 RX ADMIN — LORAZEPAM 2 MG: 2 INJECTION INTRAMUSCULAR; INTRAVENOUS at 15:33

## 2019-01-01 RX ADMIN — SODIUM CHLORIDE, PRESERVATIVE FREE 10 ML: 5 INJECTION INTRAVENOUS at 20:18

## 2019-01-01 RX ADMIN — MORPHINE SULFATE 4 MG: 4 INJECTION INTRAVENOUS at 21:56

## 2019-01-01 RX ADMIN — MORPHINE SULFATE 4 MG: 4 INJECTION INTRAVENOUS at 15:33

## 2019-01-01 RX ADMIN — PROPOFOL 20 MCG/KG/MIN: 10 INJECTION, EMULSION INTRAVENOUS at 07:22

## 2019-01-01 RX ADMIN — MORPHINE SULFATE 4 MG: 4 INJECTION INTRAVENOUS at 17:44

## 2019-01-01 RX ADMIN — MORPHINE SULFATE 4 MG: 4 INJECTION INTRAVENOUS at 20:17

## 2019-01-01 RX ADMIN — LEVETIRACETAM 500 MG: 500 INJECTION, SOLUTION INTRAVENOUS at 07:19

## 2019-01-01 RX ADMIN — PROPOFOL 10 MCG/KG/MIN: 10 INJECTION, EMULSION INTRAVENOUS at 06:17

## 2019-01-01 RX ADMIN — IOVERSOL 90 ML: 741 INJECTION INTRA-ARTERIAL; INTRAVENOUS at 06:25

## 2019-01-01 RX ADMIN — LORAZEPAM 2 MG: 2 INJECTION INTRAMUSCULAR at 17:48

## 2019-01-01 RX ADMIN — MANNITOL 66.8 G: 20 INJECTION, SOLUTION INTRAVENOUS at 09:53

## 2019-01-01 RX ADMIN — MORPHINE SULFATE 4 MG: 4 INJECTION INTRAVENOUS at 19:00

## 2019-01-01 RX ADMIN — MORPHINE SULFATE 4 MG: 4 INJECTION INTRAVENOUS at 16:14

## 2019-01-01 RX ADMIN — SODIUM CHLORIDE 1000 ML: 9 INJECTION, SOLUTION INTRAVENOUS at 10:40

## 2019-01-01 ASSESSMENT — PULMONARY FUNCTION TESTS
PIF_VALUE: 24
PIF_VALUE: 15
PIF_VALUE: 10
PIF_VALUE: 21

## 2019-01-01 ASSESSMENT — PAIN SCALES - GENERAL: PAINLEVEL_OUTOF10: 0

## 2019-09-01 PROBLEM — I62.9 INTRACRANIAL HEMORRHAGE (HCC): Status: ACTIVE | Noted: 2019-01-01

## 2019-09-01 NOTE — ED PROVIDER NOTES
 morphine injection 4 mg       DDX: Subarachnoid, epidural, subdural, intraparenchymal    DIAGNOSTIC RESULTS / EMERGENCY DEPARTMENT COURSE / MDM     LABS:  Results for orders placed or performed during the hospital encounter of 09/01/19   STROKE PANEL   Result Value Ref Range    WBC  k/uL     Unable to perform testing: Specimen clotted. NOTIFIED ROHITH CHEW    RBC  m/uL     Unable to perform testing: Specimen clotted. NOTIFIED ROHITH CHEW    Hemoglobin  g/dL     Unable to perform testing: Specimen clotted. NOTIFIED ROHITH CHEW    Hematocrit  %     Unable to perform testing: Specimen clotted. NOTIFIED ROHITH CHEW    MCV  fL     Unable to perform testing: Specimen clotted. NOTIFIED ROHITH CHEW    MCH  pg     Unable to perform testing: Specimen clotted. NOTIFIED ROHITH CHEW    MCHC  g/dL     Unable to perform testing: Specimen clotted. NOTIFIED ROHITH CHEW    RDW  %     Unable to perform testing: Specimen clotted. NOTIFIED ROHITH CHEW    Platelets  k/uL     Unable to perform testing: Specimen clotted. NOTIFIED ROHITH CHEW    MPV  fL     Unable to perform testing: Specimen clotted. NOTIFIED ROHITH CHEW    NRBC Automated  per 100 WBC     Unable to perform testing: Specimen clotted. NOTIFIED ROHITH CHEW    Differential Type Unable to perform testing: Specimen clotted. Seg Neutrophils Unable to perform testing: Specimen clotted. %    Lymphocytes Unable to perform testing: Specimen clotted. %    Monocytes Unable to perform testing: Specimen clotted. %    Eosinophils % Unable to perform testing: Specimen clotted. %    Basophils Unable to perform testing: Specimen clotted. %    Immature Granulocytes Unable to perform testing: Specimen clotted. 0 %    Segs Absolute Unable to perform testing: Specimen clotted. k/uL    Absolute Lymph # Unable to perform testing: Specimen clotted. k/uL    Absolute Mono # Unable to perform testing: Specimen clotted. k/uL    Absolute Eos # Unable to perform testing: Specimen clotted.  k/uL    Basophils Absolute Unable to perform testing: Specimen clotted. 0.0 - 0.2 k/uL    Absolute Immature Granulocyte Unable to perform testing: Specimen clotted. 0.00 - 0.30 k/uL    WBC Morphology Unable to perform testing: Specimen clotted. RBC Morphology Unable to perform testing: Specimen clotted. Platelet Estimate Unable to perform testing: Specimen clotted. Protime  sec     Unable to perform testing: Specimen clotted. NOTIFIED ROHITH CHEW    INR       Unable to perform testing: Specimen clotted. NOTIFIED ROHITH CHEW    PTT  sec     Unable to perform testing: Specimen clotted. NOTIFIED ROHITH CHEW    Myoglobin 143 (H) 25 - 58 ng/mL    Troponin, High Sensitivity 266 (HH) 0 - 14 ng/L    Troponin T NOT REPORTED <0.03 ng/mL    Troponin Interp NOT REPORTED     Glucose 193 (H) 70 - 99 mg/dL    BUN 13 8 - 23 mg/dL    CREATININE 0.50 0.50 - 0.90 mg/dL    Bun/Cre Ratio NOT REPORTED 9 - 20    Calcium 8.0 (L) 8.6 - 10.4 mg/dL    Sodium 136 135 - 144 mmol/L    Potassium 4.1 3.7 - 5.3 mmol/L    Chloride 102 98 - 107 mmol/L    CO2 21 20 - 31 mmol/L    Anion Gap 13 9 - 17 mmol/L    GFR Non-African American >60 >60 mL/min    GFR African American >60 >60 mL/min    GFR Comment          GFR Staging NOT REPORTED     Total CK 92 26 - 192 U/L    CK-MB 4.3 <5.4 ng/mL    % CKMB 4.7 (H) 0.0 - 3.0 %    CKMB Interpretation NORMAL ISOENZYME PATTERN    SPECIMEN REJECTION   Result Value Ref Range    Specimen Source . BLOOD     Ordered Test CDP PT PTT     Reason for Rejection Unable to perform testing: Specimen clotted.      - NOT REPORTED    CBC Auto Differential   Result Value Ref Range    WBC 18.9 (H) 3.5 - 11.3 k/uL    RBC 4.81 3.95 - 5.11 m/uL    Hemoglobin 13.6 11.9 - 15.1 g/dL    Hematocrit 43.0 36.3 - 47.1 %    MCV 89.4 82.6 - 102.9 fL    MCH 28.3 25.2 - 33.5 pg    MCHC 31.6 28.4 - 34.8 g/dL    RDW 14.5 (H) 11.8 - 14.4 %    Platelets See Reflexed IPF Result 138 - 453 k/uL    MPV NOT REPORTED 8.1 - 13.5 fL    NRBC Automated 0.0 0.0 per 100 WBC    Differential Type NOT REPORTED WBC Morphology NOT REPORTED     RBC Morphology ANISOCYTOSIS PRESENT     Platelet Estimate NOT REPORTED     Seg Neutrophils 83 (H) 36 - 65 %    Lymphocytes 9 (L) 24 - 43 %    Monocytes 7 3 - 12 %    Eosinophils % 1 1 - 4 %    Basophils 0 0 - 2 %    Immature Granulocytes 1 (H) 0 %    Segs Absolute 15.65 (H) 1.50 - 8.10 k/uL    Absolute Lymph # 1.61 1.10 - 3.70 k/uL    Absolute Mono # 1.29 (H) 0.10 - 1.20 k/uL    Absolute Eos # 0.12 0.00 - 0.44 k/uL    Basophils Absolute 0.08 0.00 - 0.20 k/uL    Absolute Immature Granulocyte 0.11 0.00 - 0.30 k/uL   Immature Platelet Fraction   Result Value Ref Range    Platelet, Fluorescence Platelet clumps present, count appears adequate. 138 - 453 k/uL   Protime-INR   Result Value Ref Range    Protime 10.0 9.0 - 12.0 sec    INR 0.9    APTT   Result Value Ref Range    PTT 17.2 (L) 20.5 - 30.5 sec   SPECIMEN REJECTION   Result Value Ref Range    Specimen Source . BLOOD     Ordered Test PT PTT     Reason for Rejection Unable to perform testing: Specimen clotted.      - NOT REPORTED    Troponin   Result Value Ref Range    Troponin, High Sensitivity 275 (HH) 0 - 14 ng/L    Troponin T NOT REPORTED <0.03 ng/mL    Troponin Interp NOT REPORTED    Sodium   Result Value Ref Range    Sodium 139 135 - 144 mmol/L   Arterial Blood Gas, POC   Result Value Ref Range    POC pH 7.515 (H) 7.350 - 7.450    POC pCO2 30.2 (L) 35.0 - 48.0 mm Hg    POC PO2 148.3 (H) 83.0 - 108.0 mm Hg    POC HCO3 24.3 21.0 - 28.0 mmol/L    TCO2 (calc), Art 25 22.0 - 29.0 mmol/L    Negative Base Excess, Art NOT REPORTED 0.0 - 2.0    Positive Base Excess, Art 2 0.0 - 3.0    POC O2  (H) 94.0 - 98.0 %    O2 Device/Flow/% Adult Ventilator     Иван Test POSITIVE     Sample Site Right Radial Artery     Mode AC     FIO2 30.0     Pt Temp NOT REPORTED     POC pH Temp NOT REPORTED     POC pCO2 Temp NOT REPORTED mm Hg    POC pO2 Temp NOT REPORTED mm Hg       IMPRESSION: intraparenchymal hemorrhage    RADIOLOGY:  Ct Head Wo atelectasis versus small pleural effusion in the left costophrenic angle. No pneumothorax. No free air below the diaphragm. Minimal atelectasis versus small pleural effusion in the left costophrenic angle. Cta Head Neck W Contrast    Result Date: 9/1/2019  EXAMINATION: CTA OF THE HEAD AND NECK WITH CONTRAST 9/1/2019 6:09 am: TECHNIQUE: CTA of the head and neck was performed with the administration of intravenous contrast. Multiplanar reformatted images are provided for review. MIP images are provided for review. Stenosis of the internal carotid arteries measured using NASCET criteria. Dose modulation, iterative reconstruction, and/or weight based adjustment of the mA/kV was utilized to reduce the radiation dose to as low as reasonably achievable. COMPARISON: CT head 09/01/2019 HISTORY: ORDERING SYSTEM PROVIDED HISTORY: stroke TECHNOLOGIST PROVIDED HISTORY: Reason for Exam: stroke Acuity: Acute Type of Exam: Initial FINDINGS: CTA NECK: AORTIC ARCH/ARCH VESSELS: There is a normal branch pattern of the aortic arch. No significant stenosis is seen of the innominate artery or subclavian arteries. There is atherosclerotic calcification of the aortic arch. There is mild atherosclerotic calcification of the origin of the innominate and left common carotid artery and left subclavian artery. CAROTID ARTERIES: The common carotid arteries are patent bilaterally without any significant narrowing or stenosis. There is atherosclerotic calcification in the region of the left internal carotid artery bulb with approximately 70%  stenosis using NASCET criteria. There is atherosclerotic calcification in the region of the right internal carotid artery bulb with approximately 30% stenosis using NASCET criteria. The internal carotid arteries are then patent through the skull base. VERTEBRAL ARTERIES: The vertebral arteries are codominant. There is no narrowing or stenosis of the vertebral arteries.  SOFT TISSUES: The patient

## 2019-09-01 NOTE — CONSULTS
Palliative Care Inpatient Consult    NAME:  Veronica Fox  MEDICAL RECORD NUMBER:  [id number]  AGE: [de-identified] y.o. GENDER: female  : 1940  TODAY'S DATE:  2019    Reasons for Consultation:    Symptom and/or pain management  Provision of information regarding PC and/or hospice philosophies  Complex, time-intensive communication and interdisciplinary psychosocial support  Clarification of goals of care and/or assistance with difficult decision-making  Guidance in regards to resources and transition(s)    Members of PC team contributing to this consultation are :  Dr. Eunice Delgadillo palliative care attending  History of Present Illness     The patient is a [de-identified] y.o. Non-/non  female who presents with Cerebrovascular Accident      Referred to Palliative Care by   [x] Physician   [] Nursing  [] Family Request   [] Other:       She was admitted to the neuro critical care service for Intracranial hemorrhage ([facility] [address] [I62.9]. Her hospital course has been associated with <principal problem not specified>. The patient has a complicated medical history and has been hospitalized since 2019  5:48 AM.  The patient was transferred from outside facility where she presented with left-sided weakness. As per records patient's daughter stated that her mother could not lift her left arm or be assisted in a sitting position in the bed. The patient had complained of left sided jaw/tooth pain to the daughter who had gone to the pharmacy to get Verlie Dach and upon return found her mother unable to move the left side. Patient's daughter called EMS and was taken to the outlying facility where patient was intubated and transferred to [facility] ER for further evaluation and management. CTh showed Large right-sided IPH with IV extension and midline shift. Neurosurgery was consulted and they did not recommend any surgical intervention. Palliative care consulted for goals of care and family support.     Active Hospital Problems occasional assist; LOC full/confusion  ___50%  Mainly sit/lie; Extensive disease; Can't do any work; Considerable assist; intake normal or reduced; LOC full/confusion  ___40%  Mainly in bed; Extensive disease; Mainly assist; intake normal or reduced; LOC full/confusion   ___30%  Bed Bound; Extensive disease; Total care; intake reduced; LOCfull/confusion  __x_20%  Bed Bound; Extensive disease; Total care; intake minimal; Drowsy/coma  ___10%  Bed Bound; Extensive disease; Total care; Mouth care only; Drowsy/coma  ___0       Death      Plan      Palliative Interaction:  The patient was seen today and remains intubated, sedated, unresponsive    I met with neuro critical care team attending Dr. Frankie Odom and discussed patient's current medical conditions with him    Dr. Frankie Odom informed me that patient's code status has been changed to DNR CCA with intubation and all treatments to continue    I met with patient's family in the waiting room on the fifth floor and discussed patient's current medical conditions with them and it seemed that they are understanding patient's current medical conditions and told that they are waiting for other family members to come before discussing further goals of care    I explained the family the role of palliative care and inform them that palliative care is for their support and strength    I offered comfort and emotional support to the family    Education/support to staff  Education/support to family  Education/support to patient  Discharge planning/helping to coordinate care  Communications with primary service  Caregiver support/education  Code status clarified: Full Code  Code status clarified: [facility]  Code status clarified: DNRCCA  Ohter major issues     Principle Problem/Diagnosis:  Acute CVA  Additional Assessments:   Active Problems:   Intracranial hemorrhage (HCC)  Intubated    1- Symptom management/ pain control     Pain Assessment:  Pain is controlled with current analgesics. Medication(s) being used: acetaminophen. Anxiety:  none                          Dyspnea:  none                          Fatigue:  none    Other: Intubated     We feel the patient symptoms are being controlled. her current regimen is reviewed by myself and discussed with the staff. 2- Goals of care evaluation   The patient goals of care are spiritual needs, strengthening relationships, preserve independence/autonomy/control and support for family/caregiver   Goals of care discussed with:    [] Patient independently    [] Patient and Family    [x] Family or Healthcare DPOA independently    [] Unable to discuss with patient, family/DPOA not present    3- Code Status  DNR-CCA    4- Other recommendations   - We will continue to provide comfort and support to the patient and the family  Please call with any palliative questions or concerns. Palliative Care Team is available via perfect serve or via phone. Palliative Care will continue to follow Ms. Aguilar's care as needed. Thank you for allowing Palliative Care to participate in the care of Ms. Isidro Wade . This note has been dictated by dragon, typing errors may be a possibility.     The total time I spent in seeing the patient, discussing goals of care, advanced directives, code status and other major issues was more than 60 minutes      Electronically signed by   Kee Ling MD  Palliative Care Team  on 9/1/2019 at 11:10 AM    Palliative care office: [phone]

## 2019-09-01 NOTE — ED PROVIDER NOTES
[facility] ED     Emergency Department     Faculty Attestation        I performed a history and physical examination of the patient and discussed management with the resident. I reviewed the residents note and agree with the documented findings and plan of care. Any areas of disagreement are noted on the chart. I was personally present for the key portions of any procedures. I have documented in the chart those procedures where I was not present during the key portions. I have reviewed the emergency nurses triage note. I agree with the chief complaint, past medical history, past surgical history, allergies, medications, social and family history as documented unless otherwise noted below. For Physician Assistant/ Nurse Practitioner cases/documentation I have personally evaluated this patient and have completed at least one if not all key elements of the E/M (history, physical exam, and MDM). Additional findings are as noted. PCP:  Elias Diaz    Pertinent Comments:     Patient is an 79-year-old female transferred from [place] emergency room with CT there showing intraparenchymal hemorrhage with intraventricular extension and left-sided weakness. Became unresponsive and is now intubated prior to arrival.   Stroke alert being called    Creatinine is 0.6 as well as coagulation profile normal and platelets normal      CRITICAL CARE: There was a high probability of clinically significant/life threatening deterioration in this patient's condition which required my urgent intervention. Total critical care time was 30 minutes. This excludes any time for separately reportable procedures. (Please note that portions of this note were completed with a voice recognition program. Efforts were made to edit the dictations but occasionally words are mis-transcribed.  Whenever words are used in this note in any gender, they shall be construed as though they were used in the gender appropriate to the circumstances; and whenever words are used in this note in the singular or plural form, they shall be construed as though they were used in the form appropriate to the circumstances.)    MD Marzena Vasques  Attending Emergency Medicine Physician              Cole Braxton MD  09/01/19 Gume Trejo MD  09/01/19 [id number]

## 2019-09-01 NOTE — CONSULTS
file   Occupational History    Not on file   Social Needs    Financial resource strain: Not on file    Food insecurity:     Worry: Not on file     Inability: Not on file    Transportation needs:     Medical: Not on file     Non-medical: Not on file   Tobacco Use    Smoking status: Former Smoker     Types: Cigarettes     Last attempt to quit: 1/1/2009     Years since quitting: 10.6    Smokeless tobacco: Never Used   Substance and Sexual Activity    Alcohol use: No    Drug use: No    Sexual activity: Not on file   Lifestyle    Physical activity:     Days per week: Not on file     Minutes per session: Not on file    Stress: Not on file   Relationships    Social connections:     Talks on phone: Not on file     Gets together: Not on file     Attends Amish service: Not on file     Active member of club or organization: Not on file     Attends meetings of clubs or organizations: Not on file     Relationship status: Not on file    Intimate partner violence:     Fear of current or ex partner: Not on file     Emotionally abused: Not on file     Physically abused: Not on file     Forced sexual activity: Not on file   Other Topics Concern    Not on file   Social History Narrative    Not on file       Family History:       Problem Relation Age of Onset    Cancer Mother     Emphysema Father        Allergies: Other    Home Medications:  Prior to Admission medications    Medication Sig Start Date End Date Taking?  Authorizing Provider   levETIRAcetam (KEPPRA) 250 MG tablet Take 1 tablet by mouth 2 times daily 1/10/19   Jayden Henriquez MD   memantine ER (NAMENDA XR) 28 MG CP24 extended release capsule take 1 capsule by mouth daily 1/10/19   Jayden Henriquez MD   aspirin 81 MG chewable tablet Take 1 tablet by mouth daily 9/11/18   Nickie Aiken MD   rosuvastatin (CRESTOR) 10 MG tablet Take 1 tablet by mouth nightly 9/6/18   Nickie Aiken MD   butalbital-acetaminophen-caffeine (FIORICET, ESGIC) -40 MG per 09/05/2018    RDW 15.2 09/05/2018    LYMPHOPCT 21 09/05/2018    MONOPCT 9 09/05/2018    BASOPCT 1 09/05/2018    MONOSABS 0.65 09/05/2018    LYMPHSABS 1.60 09/05/2018    EOSABS 0.35 09/05/2018    BASOSABS 0.08 09/05/2018    DIFFTYPE NOT REPORTED 09/05/2018     BMP:    Lab Results   Component Value Date     09/10/2018    K 4.0 09/10/2018     09/10/2018    CO2 25 09/10/2018    BUN 12 09/10/2018    LABALBU 3.5 09/10/2018    CREATININE 0.59 09/10/2018    CALCIUM 9.1 09/10/2018    GFRAA >60 09/10/2018    LABGLOM >60 09/10/2018    GLUCOSE 147 09/10/2018       Radiology Review:    1.) CT Head without contrast: (Reviewed at 7657)  Massive right-sided intraparenchymal hematoma as described with   subarachnoid/intraventricular extension.       Severe right to left midline shift. 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 female who presents with L sided weakness and numbness complicated by persistent emesis, requiring intubation and transfer to [facility]. CTH showing Large L IPH with midline shift of 12 mm with obstructive hydrocephalus. Loaded with Keppra and given mannitol. 1. Last Known Well (date and time): 0930PM    2. Candidate for IV tPA therapy     Yes []     No  [x] due to the following exclusion criteria: Large IPH Bleed    3.  Candidate for

## 2019-09-02 NOTE — FLOWSHEET NOTE
Assessment  Patient was terminally extubated around 5:25 p.m. Patient was later transferred from 523 to 543 and is on an oxygen mask and still in a comtose type condition. .  Daughter Shakira De La Cruz is still in the room with her mother. Intervention  Earlier [facility] took [employer] with her to the terminal extubation of the patient   During that time they prayed with the family,  Gave the patient a prayer blanket and did a ritual with the family after the terminal extubation. See earlier note from Arminda Valles.  An[place]ent to [phone] to check with nurses because she had not heard anything on the patient and nurse told  that the patent was moved to 543.  went there to check on the patient and her daughter who is with her. Daughter asked  to contact [address] [place] [address] to let the Lutheran know of her mom's condition and where she is at.  did so.  told daughter again that if she needed any thing from [facility] chaplains are on duty around the clock - every day    Outcome  Family were very grateful for all the support they have been receiving    [address] is ready to provide whatever spiritual and emotional support the family needs or wants.               09/01/19 2020   Encounter Summary   Services provided to: Patient and family together   Referral/Consult From: Nurse   Support System Children;Family members   Place of [address] [place] [address]   (9/1/19)   Complexity of Encounter High   Length of Encounter 30 minutes   Spiritual Assessment Completed Yes   Spiritual/Hindu   Type Spiritual support

## 2019-09-02 NOTE — FLOWSHEET NOTE
[facility]   Patient Death Note  DEATH   Shift date: 2019  Shift day:   Shift #: 3                 Room # 4214/3509-36   Name: Rachel Masters            Age: [de-identified] y.o. Gender: female          Scientology: [address] [place] [address] [place] of Christianity: [address] [place] [address] Date & Time: 2019  5:48 AM     Referral: Nurse   Actual date of death: 2019   TOD: 0041       SITUATION AT DEATH:  Patient's code status was changed to \"DNR-CC,\" and patient  at 64, with daughter Argelia Roper at bedside. IS THIS A 'S CASE? No    SPIRITUAL/EMOTIONAL INTERVENTION:   responded to page and met patient's daughter, Argelia Roper, and granddaughter, Terry Tirado, at bedside.  provided comfort, support, and grief care for them throughout encounter. Patient's daughter shared life review and spoke of patient being [place]. \" Patient's family members were tearful in room and shared about patient \"no longer being in pain and finally resting alongside her spouse,\" who  six years prior.  assisted patient's daughter in signing Release of Body form.  presented grief packet to family.  will send sympathy card to family. Patient's family thanked  for support and care. Family Received Grief Packet? Yes       DOCTOR SIGNING DEATH CERTIFICATE: Unknown at time of writing.  spoke with [facility], who will contact the  home  (Please note which nurse you spoke with to confirm the  home will be contacted. Family should not be listed)    Copy of COMPLETED Release of Body Form Received? Yes     HOME:  Name: [facility]: [facility]  Phone Number: [phone]    NEXT OF KIN:  Name: Luís Timmons  Relationship: Daughter  Street Address: [address] [facility] J: Deshawn Dorman: [place]  Zip code: [address]   Phone Number: [phone]    [facility]? No    IF SO, WHAT?   N/A    Electronically signed by Kristy Ling, on 9/2/2019 at 4:46 AM.  [facility]  [phone]

## 2019-09-08 NOTE — DISCHARGE SUMMARY
No pulses felt. No spontaneous respiration. LABS/IMAGING     No results for input(s): WBC, HGB, HCT, PLT, NA, K, CL, CO2, BUN, CREATININE in the last 72 hours. Ct Head Wo Contrast    Result Date: 9/1/2019  EXAMINATION: CT OF THE HEAD WITHOUT CONTRAST  9/1/2019 6:09 am TECHNIQUE: CT of the head was performed without the administration of intravenous contrast. Dose modulation, iterative reconstruction, and/or weight based adjustment of the mA/kV was utilized to reduce the radiation dose to as low as reasonably achievable. COMPARISON: None. HISTORY: ORDERING SYSTEM PROVIDED HISTORY: stroke TECHNOLOGIST PROVIDED HISTORY: Reason for Exam: stroke Acuity: Acute Type of Exam: Initial FINDINGS: BRAIN/VENTRICLES: There is a massive intraparenchymal hemorrhage involving right cerebral hemisphere and basal ganglia with intraventricular and subarachnoid extension. Acute hematoma estimated at 11 x 5 x 5 cm. There is resulting mass effect with effacement of sulci, surrounding edema pattern, and right to left midline shift 16 mm. There is effacement body of right lateral ventricle with obstructive hydrocephalus. There is no downward herniation pattern. ORBITS: The visualized portion of the orbits demonstrate no acute abnormality. SINUSES: The visualized paranasal sinuses and mastoid air cells demonstrate no acute abnormality. SOFT TISSUES/SKULL:  No acute abnormality of the visualized skull or soft tissues. Massive right-sided intraparenchymal hematoma as described with subarachnoid/intraventricular extension. Severe right to left midline shift. Findings were discussed with Dr. Emili Richey At 6:35 am on 9/1/2019.      Xr Chest Portable    Result Date: 9/1/2019  EXAMINATION: ONE XRAY VIEW OF THE CHEST 9/1/2019 7:16 am COMPARISON: Chest radiograph dated 09/06/2018 HISTORY: ORDERING SYSTEM PROVIDED HISTORY: intubated TECHNOLOGIST PROVIDED HISTORY: intubated Reason for Exam: intubation FINDINGS: Tip of endotracheal tube is